# Patient Record
Sex: FEMALE | Race: WHITE | NOT HISPANIC OR LATINO | Employment: OTHER | URBAN - METROPOLITAN AREA
[De-identification: names, ages, dates, MRNs, and addresses within clinical notes are randomized per-mention and may not be internally consistent; named-entity substitution may affect disease eponyms.]

---

## 2024-02-08 DIAGNOSIS — M25.552 ACUTE POSTOPERATIVE PAIN OF LEFT HIP: Primary | ICD-10-CM

## 2024-02-08 DIAGNOSIS — G89.18 ACUTE POSTOPERATIVE PAIN OF LEFT HIP: Primary | ICD-10-CM

## 2024-03-21 ENCOUNTER — OFFICE VISIT (OUTPATIENT)
Dept: ORTHOPEDICS | Facility: CLINIC | Age: 69
End: 2024-03-21
Payer: MEDICARE

## 2024-03-21 ENCOUNTER — LAB VISIT (OUTPATIENT)
Dept: LAB | Facility: HOSPITAL | Age: 69
End: 2024-03-21
Attending: ORTHOPAEDIC SURGERY
Payer: MEDICARE

## 2024-03-21 ENCOUNTER — HOSPITAL ENCOUNTER (OUTPATIENT)
Dept: RADIOLOGY | Facility: CLINIC | Age: 69
Discharge: HOME | End: 2024-03-21
Attending: ORTHOPAEDIC SURGERY
Payer: MEDICARE

## 2024-03-21 VITALS
BODY MASS INDEX: 34.66 KG/M2 | WEIGHT: 208 LBS | HEIGHT: 65 IN | SYSTOLIC BLOOD PRESSURE: 138 MMHG | DIASTOLIC BLOOD PRESSURE: 76 MMHG | HEART RATE: 68 BPM

## 2024-03-21 DIAGNOSIS — M25.552 ACUTE POSTOPERATIVE PAIN OF LEFT HIP: ICD-10-CM

## 2024-03-21 DIAGNOSIS — G89.18 ACUTE POSTOPERATIVE PAIN OF LEFT HIP: ICD-10-CM

## 2024-03-21 DIAGNOSIS — Z96.642 HX OF TOTAL HIP ARTHROPLASTY, LEFT: Primary | ICD-10-CM

## 2024-03-21 DIAGNOSIS — T84.018A FAILED TOTAL HIP ARTHROPLASTY, INITIAL ENCOUNTER: ICD-10-CM

## 2024-03-21 DIAGNOSIS — Z96.642 HX OF TOTAL HIP ARTHROPLASTY, LEFT: ICD-10-CM

## 2024-03-21 DIAGNOSIS — Z96.649 FAILED TOTAL HIP ARTHROPLASTY, INITIAL ENCOUNTER: ICD-10-CM

## 2024-03-21 LAB
BASOPHILS # BLD AUTO: 0.02 X10(3)/MCL
BASOPHILS NFR BLD AUTO: 0.2 %
CRP SERPL HS-MCNC: 1.72 MG/L
EOSINOPHIL # BLD AUTO: 0.05 X10(3)/MCL (ref 0–0.9)
EOSINOPHIL NFR BLD AUTO: 0.4 %
ERYTHROCYTE [DISTWIDTH] IN BLOOD BY AUTOMATED COUNT: 14.4 % (ref 11.5–17)
ERYTHROCYTE [SEDIMENTATION RATE] IN BLOOD: 24 MM/HR (ref 0–20)
HCT VFR BLD AUTO: 44.4 % (ref 37–47)
HGB BLD-MCNC: 14.5 G/DL (ref 12–16)
IMM GRANULOCYTES # BLD AUTO: 0.04 X10(3)/MCL (ref 0–0.04)
IMM GRANULOCYTES NFR BLD AUTO: 0.3 %
LYMPHOCYTES # BLD AUTO: 1.25 X10(3)/MCL (ref 0.6–4.6)
LYMPHOCYTES NFR BLD AUTO: 10.1 %
MCH RBC QN AUTO: 31.1 PG (ref 27–31)
MCHC RBC AUTO-ENTMCNC: 32.7 G/DL (ref 33–36)
MCV RBC AUTO: 95.3 FL (ref 80–94)
MONOCYTES # BLD AUTO: 0.8 X10(3)/MCL (ref 0.1–1.3)
MONOCYTES NFR BLD AUTO: 6.4 %
NEUTROPHILS # BLD AUTO: 10.27 X10(3)/MCL (ref 2.1–9.2)
NEUTROPHILS NFR BLD AUTO: 82.6 %
NRBC BLD AUTO-RTO: 0 %
PLATELET # BLD AUTO: 417 X10(3)/MCL (ref 130–400)
PMV BLD AUTO: 9.9 FL (ref 7.4–10.4)
RBC # BLD AUTO: 4.66 X10(6)/MCL (ref 4.2–5.4)
WBC # SPEC AUTO: 12.43 X10(3)/MCL (ref 4.5–11.5)

## 2024-03-21 PROCEDURE — 73502 X-RAY EXAM HIP UNI 2-3 VIEWS: CPT | Mod: LT,,, | Performed by: ORTHOPAEDIC SURGERY

## 2024-03-21 PROCEDURE — 36415 COLL VENOUS BLD VENIPUNCTURE: CPT

## 2024-03-21 PROCEDURE — 99204 OFFICE O/P NEW MOD 45 MIN: CPT | Mod: ,,, | Performed by: ORTHOPAEDIC SURGERY

## 2024-03-21 PROCEDURE — 85025 COMPLETE CBC W/AUTO DIFF WBC: CPT

## 2024-03-21 PROCEDURE — 3288F FALL RISK ASSESSMENT DOCD: CPT | Mod: CPTII,,, | Performed by: ORTHOPAEDIC SURGERY

## 2024-03-21 PROCEDURE — 3078F DIAST BP <80 MM HG: CPT | Mod: CPTII,,, | Performed by: ORTHOPAEDIC SURGERY

## 2024-03-21 PROCEDURE — 1101F PT FALLS ASSESS-DOCD LE1/YR: CPT | Mod: CPTII,,, | Performed by: ORTHOPAEDIC SURGERY

## 2024-03-21 PROCEDURE — 3008F BODY MASS INDEX DOCD: CPT | Mod: CPTII,,, | Performed by: ORTHOPAEDIC SURGERY

## 2024-03-21 PROCEDURE — 1159F MED LIST DOCD IN RCRD: CPT | Mod: CPTII,,, | Performed by: ORTHOPAEDIC SURGERY

## 2024-03-21 PROCEDURE — 85652 RBC SED RATE AUTOMATED: CPT

## 2024-03-21 PROCEDURE — 4010F ACE/ARB THERAPY RXD/TAKEN: CPT | Mod: CPTII,,, | Performed by: ORTHOPAEDIC SURGERY

## 2024-03-21 PROCEDURE — 3075F SYST BP GE 130 - 139MM HG: CPT | Mod: CPTII,,, | Performed by: ORTHOPAEDIC SURGERY

## 2024-03-21 PROCEDURE — 86141 C-REACTIVE PROTEIN HS: CPT

## 2024-03-21 RX ORDER — CLINDAMYCIN HYDROCHLORIDE 300 MG/1
300 CAPSULE ORAL 3 TIMES DAILY PRN
Status: ON HOLD | COMMUNITY
End: 2024-06-10 | Stop reason: ALTCHOICE

## 2024-03-21 RX ORDER — DICLOFENAC SODIUM 75 MG/1
75 TABLET, DELAYED RELEASE ORAL 2 TIMES DAILY PRN
Status: ON HOLD | COMMUNITY
Start: 2024-03-09 | End: 2024-06-11 | Stop reason: HOSPADM

## 2024-03-21 RX ORDER — ATORVASTATIN CALCIUM 40 MG/1
40 TABLET, FILM COATED ORAL NIGHTLY
COMMUNITY
Start: 2024-03-13

## 2024-03-21 RX ORDER — LEVOTHYROXINE SODIUM 88 UG/1
88 TABLET ORAL NIGHTLY
COMMUNITY
Start: 2024-03-14

## 2024-03-21 RX ORDER — LISINOPRIL 10 MG/1
10 TABLET ORAL NIGHTLY
COMMUNITY
Start: 2024-03-11

## 2024-03-21 RX ORDER — DICLOFENAC SODIUM 25 MG/1
25 TABLET, DELAYED RELEASE ORAL 2 TIMES DAILY
COMMUNITY
End: 2024-04-11

## 2024-03-21 RX ORDER — GABAPENTIN 100 MG/1
100 CAPSULE ORAL 2 TIMES DAILY
COMMUNITY
Start: 2024-02-29

## 2024-03-21 RX ORDER — NITROFURANTOIN 25; 75 MG/1; MG/1
100 CAPSULE ORAL 2 TIMES DAILY
COMMUNITY
End: 2024-06-07 | Stop reason: CLARIF

## 2024-03-21 NOTE — PROGRESS NOTES
Chief Complaint:   Chief Complaint   Patient presents with    Left Hip - Pain     Left hip pain starting feb 2023. Had KARON in 2017. Pain started after 3 days of walking on treadmill. Tried anti inflammatory with no relief. Did physical therapy in August and had cortisone injections with minimum relief. Ambulates without assistive devices.        History of present illness:  69 left hip.  Patient has history of total hip arthroplasty performed about 6 or 7 years ago.  She would well for about 3 or 4 years however began developing pain 2 years prior.  She continues to have significant points of pain in the left hip.  Pain is located in her groin.  It was having issues with radicular pain has been treated for this.  The groin pain has persisted.  She does have positive start-up pain.  It was difficulty ambulating on occasion.  Uses a cane.  Has tried anti-inflammatories.  Has tried activity modification.  MRI was reviewed and performed without significant abnormality noted.  As well as bone scan.    Past Medical History:   Diagnosis Date    Arthritis     Avulsion fracture of metatarsal bone of left foot     Bursitis of left hip     Heart valve regurgitation     High cholesterol     Hypertension        Past Surgical History:   Procedure Laterality Date    CYSTOSCOPY      DILATION AND CURETTAGE OF UTERUS      LAPAROSCOPY      RADIONUCLIDE BONE SCAN      TOTAL HIP ARTHROPLASTY Left        Current Outpatient Medications   Medication Sig    atorvastatin (LIPITOR) 40 MG tablet Take 40 mg by mouth.    clindamycin (CLEOCIN) 300 MG capsule Take 300 mg by mouth 3 (three) times daily.    diclofenac (VOLTAREN) 25 MG TbEC Take 25 mg by mouth 2 (two) times daily.    diclofenac (VOLTAREN) 75 MG EC tablet Take 75 mg by mouth 2 (two) times daily as needed.    gabapentin (NEURONTIN) 100 MG capsule Take 100 mg by mouth 2 (two) times daily.    levothyroxine (SYNTHROID) 88 MCG tablet Take 88 mcg by mouth.    lisinopriL 10 MG tablet Take  10 mg by mouth.    nitrofurantoin, macrocrystal-monohydrate, (MACROBID) 100 MG capsule Take 100 mg by mouth 2 (two) times daily.     No current facility-administered medications for this visit.       Review of patient's allergies indicates:   Allergen Reactions    Bactrim [sulfamethoxazole-trimethoprim]     Honey     Latex, natural rubber     Penicillins     Shellfish containing products     Sulfa (sulfonamide antibiotics)     Wheat containing prod     Yeast        Family History   Family history unknown: Yes       Social History     Socioeconomic History    Marital status: Unknown   Tobacco Use    Smoking status: Never    Smokeless tobacco: Never   Substance and Sexual Activity    Alcohol use: Not Currently           Review of Systems:    Constitution: Negative for chills, fever, and sweats.  Negative for unexplained weight loss.    HENT:  Negative for headaches and blurry vision.    Cardiovascular:Negative for chest pain or irregular heart beat. Negative for hypertension.    Respiratory:  Negative for cough and shortness of breath.    Gastrointestinal: Negative for abdominal pain, heartburn, melena, nausea, and vomitting.    Genitourinary:  Negative bladder incontinence and dysuria.    Musculoskeletal:  See HPI    Neurological: Negative for numbness.    Psychiatric/Behavioral: Negative for depression.  The patient is not nervous/anxious.      Endocrine: Negative for polyuria    Hematologic/Lymphatic: Negative for bleeding problem.  Does not bruise/bleed easily.    Skin: Negative for poor would healing and rash      Physical Examination:    Vital Signs:    Vitals:    03/21/24 0749   BP: 138/76   Pulse: 68       Body mass index is 34.61 kg/m².    General: No acute distress, alert and oriented, healthy appearing    HEENT: Head is atraumatic, mucous membranes are moist    Neck: Supples, no JVD    Cardiovascular: Palpable dorsalis pedis and posterior tibial pulses, regular rate and rhythm to those pulses    Lungs:  Breathing non-labored    Skin: no rashes appreciated    Neurologic: Can flex and extend knees, ankles, and toes. Sensation is grossly intact    Left hip:  Range of motion of the left hip with pain and discomfort in the groin.  She is brisk cap refill disappeared sensation intact distally.  Internal and external rotation cause the patient's groin pain.  She has a positive Stinchfield to the left hip.    X-rays:  Three views of the left hip reviewed.  Patient with total hip arthroplasty.  Femur appears to be well fixed.  Acetabulum.  It was radiolucent lines in zone 1 and 2 and likely zone 3.  MRI was reviewed really not very helpful.     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  Patient's exam is consistent with a loose acetabular component.  Her x-rays also consistent with a likely acetabular component.  We discussed all treatment options today.  She would like to hold off on further surgical intervention at the current time.  We will plan to work her up for infection.  It was plan to see her back in a few weeks for further discussion.  With the patient will eventually require revision of her left hip.  Acetabular component.    This note was created using [employer] voice recognition software that occasionally misinterpreted phrases or words.    Consult note is delivered via Epic messaging service.

## 2024-04-11 ENCOUNTER — OFFICE VISIT (OUTPATIENT)
Dept: ORTHOPEDICS | Facility: CLINIC | Age: 69
End: 2024-04-11
Payer: MEDICARE

## 2024-04-11 VITALS
HEIGHT: 65 IN | HEART RATE: 70 BPM | WEIGHT: 205.81 LBS | BODY MASS INDEX: 34.29 KG/M2 | SYSTOLIC BLOOD PRESSURE: 129 MMHG | DIASTOLIC BLOOD PRESSURE: 88 MMHG

## 2024-04-11 DIAGNOSIS — T84.018A FAILED TOTAL HIP ARTHROPLASTY, INITIAL ENCOUNTER: Primary | ICD-10-CM

## 2024-04-11 DIAGNOSIS — Z96.649 FAILED TOTAL HIP ARTHROPLASTY, INITIAL ENCOUNTER: Primary | ICD-10-CM

## 2024-04-11 PROCEDURE — 3008F BODY MASS INDEX DOCD: CPT | Mod: CPTII,,, | Performed by: ORTHOPAEDIC SURGERY

## 2024-04-11 PROCEDURE — 3288F FALL RISK ASSESSMENT DOCD: CPT | Mod: CPTII,,, | Performed by: ORTHOPAEDIC SURGERY

## 2024-04-11 PROCEDURE — 4010F ACE/ARB THERAPY RXD/TAKEN: CPT | Mod: CPTII,,, | Performed by: ORTHOPAEDIC SURGERY

## 2024-04-11 PROCEDURE — 1125F AMNT PAIN NOTED PAIN PRSNT: CPT | Mod: CPTII,,, | Performed by: ORTHOPAEDIC SURGERY

## 2024-04-11 PROCEDURE — 3079F DIAST BP 80-89 MM HG: CPT | Mod: CPTII,,, | Performed by: ORTHOPAEDIC SURGERY

## 2024-04-11 PROCEDURE — 3074F SYST BP LT 130 MM HG: CPT | Mod: CPTII,,, | Performed by: ORTHOPAEDIC SURGERY

## 2024-04-11 PROCEDURE — 99214 OFFICE O/P EST MOD 30 MIN: CPT | Mod: ,,, | Performed by: ORTHOPAEDIC SURGERY

## 2024-04-11 PROCEDURE — 1159F MED LIST DOCD IN RCRD: CPT | Mod: CPTII,,, | Performed by: ORTHOPAEDIC SURGERY

## 2024-04-11 PROCEDURE — 1101F PT FALLS ASSESS-DOCD LE1/YR: CPT | Mod: CPTII,,, | Performed by: ORTHOPAEDIC SURGERY

## 2024-04-11 RX ORDER — SODIUM CHLORIDE 9 MG/ML
INJECTION, SOLUTION INTRAVENOUS CONTINUOUS
Status: CANCELLED | OUTPATIENT
Start: 2024-04-11

## 2024-04-11 NOTE — PROGRESS NOTES
Chief Complaint:   Chief Complaint   Patient presents with    Left Hip - Follow-up     Lab results-Pt states she had an injection last week which helped to improve the discomfort in her lower back.        History of present illness:  69-year-old female presents today for evaluation follow-up of painful total hip on the left side.  Patient continues to have significant complaints of pain in the left hip.  Worse with activity.  Improved by rest.  Patient has tried anti-inflammatories.  Has tried activity modification she was uses a cane.  Patient is here to go over blood work.  Likely loose acetabular component.    Past Medical History:   Diagnosis Date    Arthritis     Avulsion fracture of metatarsal bone of left foot     Bursitis of left hip     Heart valve regurgitation     High cholesterol     Hypertension        Past Surgical History:   Procedure Laterality Date    CYSTOSCOPY      DILATION AND CURETTAGE OF UTERUS      LAPAROSCOPY      RADIONUCLIDE BONE SCAN      TOTAL HIP ARTHROPLASTY Left        Current Outpatient Medications   Medication Sig    atorvastatin (LIPITOR) 40 MG tablet Take 40 mg by mouth.    clindamycin (CLEOCIN) 300 MG capsule Take 300 mg by mouth 3 (three) times daily.    diclofenac (VOLTAREN) 75 MG EC tablet Take 75 mg by mouth 2 (two) times daily as needed.    gabapentin (NEURONTIN) 100 MG capsule Take 100 mg by mouth 2 (two) times daily.    levothyroxine (SYNTHROID) 88 MCG tablet Take 88 mcg by mouth.    lisinopriL 10 MG tablet Take 10 mg by mouth.    nitrofurantoin, macrocrystal-monohydrate, (MACROBID) 100 MG capsule Take 100 mg by mouth 2 (two) times daily.     No current facility-administered medications for this visit.       Review of patient's allergies indicates:   Allergen Reactions    Bactrim [sulfamethoxazole-trimethoprim]     Honey     Latex, natural rubber     Penicillins     Shellfish containing products     Sulfa (sulfonamide antibiotics)     Wheat containing prod     Yeast         Family History   Family history unknown: Yes       Social History     Socioeconomic History    Marital status:    Tobacco Use    Smoking status: Never    Smokeless tobacco: Never   Substance and Sexual Activity    Alcohol use: Yes     Comment: Seldom    Drug use: Never    Sexual activity: Yes     Partners: Male           Review of Systems:    Constitution: Negative for chills, fever, and sweats.  Negative for unexplained weight loss.    HENT:  Negative for headaches and blurry vision.    Cardiovascular:Negative for chest pain or irregular heart beat. Negative for hypertension.    Respiratory:  Negative for cough and shortness of breath.    Gastrointestinal: Negative for abdominal pain, heartburn, melena, nausea, and vomitting.    Genitourinary:  Negative bladder incontinence and dysuria.    Musculoskeletal:  See HPI    Neurological: Negative for numbness.    Psychiatric/Behavioral: Negative for depression.  The patient is not nervous/anxious.      Endocrine: Negative for polyuria    Hematologic/Lymphatic: Negative for bleeding problem.  Does not bruise/bleed easily.    Skin: Negative for poor would healing and rash      Physical Examination:    Vital Signs:    Vitals:    04/11/24 0807   BP: 129/88   Pulse: 70       Body mass index is 34.25 kg/m².    General: No acute distress, alert and oriented, healthy appearing    HEENT: Head is atraumatic, mucous membranes are moist    Neck: Supples, no JVD    Cardiovascular: Palpable dorsalis pedis and posterior tibial pulses, regular rate and rhythm to those pulses    Lungs: Breathing non-labored    Skin: no rashes appreciated    Neurologic: Can flex and extend knees, ankles, and toes. Sensation is grossly intact    Left hip:  Range of motion left hip with groin pain.  Brisk cap refill disappeared sensation intact distally.    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  Patient was elevated white count, slightly, elevated ESR, slightly.  Discussed all treatment options from a treatment standpoint.  Patient with a failed acetabular component with failure of ingrowth and broken screw.  We would require revision of the acetabulum.  She would like to proceed with this.  She feels as though he has reached a point disability.  We will need to do an aspiration preoperatively as well.  Risks, benefits alternatives to left hip aspiration under anesthesia were discussed in detail.  We will plan to do this in the next couple weeks.  She would also like to go ahead and proceed with surgical intervention.  We will see her back afterwards to go over the results of the aspiration as well as to her preop for revision of her left hip, acetabular component only.    This note was created using [employer] voice recognition software that occasionally misinterpreted phrases or words.    Consult note is delivered via Epic messaging service.

## 2024-04-11 NOTE — H&P (VIEW-ONLY)
Chief Complaint:   Chief Complaint   Patient presents with    Left Hip - Follow-up     Lab results-Pt states she had an injection last week which helped to improve the discomfort in her lower back.        History of present illness:  69-year-old female presents today for evaluation follow-up of painful total hip on the left side.  Patient continues to have significant complaints of pain in the left hip.  Worse with activity.  Improved by rest.  Patient has tried anti-inflammatories.  Has tried activity modification she was uses a cane.  Patient is here to go over blood work.  Likely loose acetabular component.    Past Medical History:   Diagnosis Date    Arthritis     Avulsion fracture of metatarsal bone of left foot     Bursitis of left hip     Heart valve regurgitation     High cholesterol     Hypertension        Past Surgical History:   Procedure Laterality Date    CYSTOSCOPY      DILATION AND CURETTAGE OF UTERUS      LAPAROSCOPY      RADIONUCLIDE BONE SCAN      TOTAL HIP ARTHROPLASTY Left        Current Outpatient Medications   Medication Sig    atorvastatin (LIPITOR) 40 MG tablet Take 40 mg by mouth.    clindamycin (CLEOCIN) 300 MG capsule Take 300 mg by mouth 3 (three) times daily.    diclofenac (VOLTAREN) 75 MG EC tablet Take 75 mg by mouth 2 (two) times daily as needed.    gabapentin (NEURONTIN) 100 MG capsule Take 100 mg by mouth 2 (two) times daily.    levothyroxine (SYNTHROID) 88 MCG tablet Take 88 mcg by mouth.    lisinopriL 10 MG tablet Take 10 mg by mouth.    nitrofurantoin, macrocrystal-monohydrate, (MACROBID) 100 MG capsule Take 100 mg by mouth 2 (two) times daily.     No current facility-administered medications for this visit.       Review of patient's allergies indicates:   Allergen Reactions    Bactrim [sulfamethoxazole-trimethoprim]     Honey     Latex, natural rubber     Penicillins     Shellfish containing products     Sulfa (sulfonamide antibiotics)     Wheat containing prod     Yeast         Family History   Family history unknown: Yes       Social History     Socioeconomic History    Marital status:    Tobacco Use    Smoking status: Never    Smokeless tobacco: Never   Substance and Sexual Activity    Alcohol use: Yes     Comment: Seldom    Drug use: Never    Sexual activity: Yes     Partners: Male           Review of Systems:    Constitution: Negative for chills, fever, and sweats.  Negative for unexplained weight loss.    HENT:  Negative for headaches and blurry vision.    Cardiovascular:Negative for chest pain or irregular heart beat. Negative for hypertension.    Respiratory:  Negative for cough and shortness of breath.    Gastrointestinal: Negative for abdominal pain, heartburn, melena, nausea, and vomitting.    Genitourinary:  Negative bladder incontinence and dysuria.    Musculoskeletal:  See HPI    Neurological: Negative for numbness.    Psychiatric/Behavioral: Negative for depression.  The patient is not nervous/anxious.      Endocrine: Negative for polyuria    Hematologic/Lymphatic: Negative for bleeding problem.  Does not bruise/bleed easily.    Skin: Negative for poor would healing and rash      Physical Examination:    Vital Signs:    Vitals:    04/11/24 0807   BP: 129/88   Pulse: 70       Body mass index is 34.25 kg/m².    General: No acute distress, alert and oriented, healthy appearing    HEENT: Head is atraumatic, mucous membranes are moist    Neck: Supples, no JVD    Cardiovascular: Palpable dorsalis pedis and posterior tibial pulses, regular rate and rhythm to those pulses    Lungs: Breathing non-labored    Skin: no rashes appreciated    Neurologic: Can flex and extend knees, ankles, and toes. Sensation is grossly intact    Left hip:  Range of motion left hip with groin pain.  Brisk cap refill disappeared sensation intact distally.    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  Patient was elevated white count, slightly, elevated ESR, slightly.  Discussed all treatment options from a treatment standpoint.  Patient with a failed acetabular component with failure of ingrowth and broken screw.  We would require revision of the acetabulum.  She would like to proceed with this.  She feels as though he has reached a point disability.  We will need to do an aspiration preoperatively as well.  Risks, benefits alternatives to left hip aspiration under anesthesia were discussed in detail.  We will plan to do this in the next couple weeks.  She would also like to go ahead and proceed with surgical intervention.  We will see her back afterwards to go over the results of the aspiration as well as to her preop for revision of her left hip, acetabular component only.    This note was created using [employer] voice recognition software that occasionally misinterpreted phrases or words.    Consult note is delivered via Epic messaging service.

## 2024-04-15 ENCOUNTER — ANESTHESIA EVENT (OUTPATIENT)
Dept: SURGERY | Facility: HOSPITAL | Age: 69
End: 2024-04-15
Payer: MEDICARE

## 2024-04-24 ENCOUNTER — PATIENT MESSAGE (OUTPATIENT)
Dept: ADMINISTRATIVE | Facility: OTHER | Age: 69
End: 2024-04-24
Payer: MEDICARE

## 2024-04-25 ENCOUNTER — PATIENT MESSAGE (OUTPATIENT)
Dept: ADMINISTRATIVE | Facility: OTHER | Age: 69
End: 2024-04-25
Payer: MEDICARE

## 2024-04-26 ENCOUNTER — PATIENT MESSAGE (OUTPATIENT)
Dept: ADMINISTRATIVE | Facility: OTHER | Age: 69
End: 2024-04-26
Payer: MEDICARE

## 2024-04-26 ENCOUNTER — HOSPITAL ENCOUNTER (OUTPATIENT)
Facility: HOSPITAL | Age: 69
Discharge: HOME | End: 2024-04-26
Attending: ORTHOPAEDIC SURGERY | Admitting: ORTHOPAEDIC SURGERY
Payer: MEDICARE

## 2024-04-26 ENCOUNTER — ANESTHESIA (OUTPATIENT)
Dept: SURGERY | Facility: HOSPITAL | Age: 69
End: 2024-04-26
Payer: MEDICARE

## 2024-04-26 DIAGNOSIS — Z96.649 FAILED TOTAL HIP ARTHROPLASTY, INITIAL ENCOUNTER: ICD-10-CM

## 2024-04-26 DIAGNOSIS — T84.018A FAILED TOTAL HIP ARTHROPLASTY, INITIAL ENCOUNTER: Primary | ICD-10-CM

## 2024-04-26 DIAGNOSIS — Z96.649 FAILED TOTAL HIP ARTHROPLASTY, INITIAL ENCOUNTER: Primary | ICD-10-CM

## 2024-04-26 DIAGNOSIS — T84.018A FAILED TOTAL HIP ARTHROPLASTY, INITIAL ENCOUNTER: ICD-10-CM

## 2024-04-26 LAB
%MONO NUCL BF (OHS): 86 %
%POLYMORP BF(OHS): 14 %
CLARITY BODY FLUID (OHS): NORMAL
COLOR BODY FLUID (OHS): YELLOW
RBC COUNT BODY FLUID (OHS): 2000 /UL
WBC # FLD AUTO: 518 /UL

## 2024-04-26 PROCEDURE — D9220A PRA ANESTHESIA: Mod: ANES,,, | Performed by: STUDENT IN AN ORGANIZED HEALTH CARE EDUCATION/TRAINING PROGRAM

## 2024-04-26 PROCEDURE — 87102 FUNGUS ISOLATION CULTURE: CPT | Performed by: ORTHOPAEDIC SURGERY

## 2024-04-26 PROCEDURE — 20610 DRAIN/INJ JOINT/BURSA W/O US: CPT | Mod: LT,,, | Performed by: ORTHOPAEDIC SURGERY

## 2024-04-26 PROCEDURE — D9220A PRA ANESTHESIA: Mod: CRNA,,, | Performed by: NURSE ANESTHETIST, CERTIFIED REGISTERED

## 2024-04-26 PROCEDURE — 37000008 HC ANESTHESIA 1ST 15 MINUTES: Performed by: ORTHOPAEDIC SURGERY

## 2024-04-26 PROCEDURE — 25000003 PHARM REV CODE 250: Performed by: ORTHOPAEDIC SURGERY

## 2024-04-26 PROCEDURE — 89051 BODY FLUID CELL COUNT: CPT | Performed by: ORTHOPAEDIC SURGERY

## 2024-04-26 PROCEDURE — 87070 CULTURE OTHR SPECIMN AEROBIC: CPT | Performed by: ORTHOPAEDIC SURGERY

## 2024-04-26 PROCEDURE — 77002 NEEDLE LOCALIZATION BY XRAY: CPT | Mod: 26,,, | Performed by: ORTHOPAEDIC SURGERY

## 2024-04-26 PROCEDURE — 87205 SMEAR GRAM STAIN: CPT | Performed by: ORTHOPAEDIC SURGERY

## 2024-04-26 PROCEDURE — 71000015 HC POSTOP RECOV 1ST HR: Performed by: ORTHOPAEDIC SURGERY

## 2024-04-26 PROCEDURE — 63600175 PHARM REV CODE 636 W HCPCS: Performed by: NURSE ANESTHETIST, CERTIFIED REGISTERED

## 2024-04-26 PROCEDURE — 25000003 PHARM REV CODE 250: Performed by: NURSE ANESTHETIST, CERTIFIED REGISTERED

## 2024-04-26 PROCEDURE — 27095 INJECTION FOR HIP X-RAY: CPT | Mod: LT,,, | Performed by: ORTHOPAEDIC SURGERY

## 2024-04-26 PROCEDURE — 36000704 HC OR TIME LEV I 1ST 15 MIN: Performed by: ORTHOPAEDIC SURGERY

## 2024-04-26 PROCEDURE — 87075 CULTR BACTERIA EXCEPT BLOOD: CPT | Performed by: ORTHOPAEDIC SURGERY

## 2024-04-26 RX ORDER — LIDOCAINE HYDROCHLORIDE 10 MG/ML
INJECTION, SOLUTION EPIDURAL; INFILTRATION; INTRACAUDAL; PERINEURAL
Status: DISCONTINUED | OUTPATIENT
Start: 2024-04-26 | End: 2024-04-26

## 2024-04-26 RX ORDER — LIDOCAINE HYDROCHLORIDE 10 MG/ML
INJECTION INFILTRATION; PERINEURAL
Status: DISCONTINUED | OUTPATIENT
Start: 2024-04-26 | End: 2024-04-26 | Stop reason: HOSPADM

## 2024-04-26 RX ORDER — PROPOFOL 10 MG/ML
VIAL (ML) INTRAVENOUS
Status: DISCONTINUED | OUTPATIENT
Start: 2024-04-26 | End: 2024-04-26

## 2024-04-26 RX ORDER — LIDOCAINE HYDROCHLORIDE 10 MG/ML
INJECTION INFILTRATION; PERINEURAL
Status: DISCONTINUED
Start: 2024-04-26 | End: 2024-04-26 | Stop reason: HOSPADM

## 2024-04-26 RX ORDER — SODIUM CHLORIDE 9 MG/ML
INJECTION, SOLUTION INTRAVENOUS CONTINUOUS
Status: DISCONTINUED | OUTPATIENT
Start: 2024-04-26 | End: 2024-04-26 | Stop reason: HOSPADM

## 2024-04-26 RX ORDER — BETAMETHASONE SODIUM PHOSPHATE AND BETAMETHASONE ACETATE 3; 3 MG/ML; MG/ML
INJECTION, SUSPENSION INTRA-ARTICULAR; INTRALESIONAL; INTRAMUSCULAR; SOFT TISSUE
Status: DISCONTINUED
Start: 2024-04-26 | End: 2024-04-26 | Stop reason: WASHOUT

## 2024-04-26 RX ADMIN — PROPOFOL 70 MG: 10 INJECTION, EMULSION INTRAVENOUS at 07:04

## 2024-04-26 RX ADMIN — PROPOFOL 50 MG: 10 INJECTION, EMULSION INTRAVENOUS at 07:04

## 2024-04-26 RX ADMIN — SODIUM CHLORIDE, SODIUM GLUCONATE, SODIUM ACETATE, POTASSIUM CHLORIDE AND MAGNESIUM CHLORIDE: 526; 502; 368; 37; 30 INJECTION, SOLUTION INTRAVENOUS at 06:04

## 2024-04-26 RX ADMIN — LIDOCAINE HYDROCHLORIDE 5 ML: 10 INJECTION, SOLUTION EPIDURAL; INFILTRATION; INTRACAUDAL; PERINEURAL at 07:04

## 2024-04-26 NOTE — ANESTHESIA PREPROCEDURE EVALUATION
04/26/2024  Radha Alexandre is a 69 y.o., female.      Pre-op Assessment    I have reviewed the Patient Summary Reports.     I have reviewed the Nursing Notes. I have reviewed the NPO Status.   I have reviewed the Medications.     Review of Systems  Anesthesia Hx:               Denies Personal Hx of Anesthesia complications.                    Cardiovascular:     Hypertension                                        Pulmonary:  Pulmonary Normal                       Hepatic/GI:  Hepatic/GI Normal                 Neurological:  Neurology Normal                                      Endocrine:        Obesity / BMI > 30      Physical Exam  General: Well nourished, Cooperative and Alert    Airway:  Mallampati: II   Mouth Opening: Normal  TM Distance: Normal  Tongue: Normal    Dental:  Intact    Chest/Lungs:  Normal Respiratory Rate        Anesthesia Plan  Type of Anesthesia, risks & benefits discussed:    Anesthesia Type: Gen Natural Airway  Intra-op Monitoring Plan: Standard ASA Monitors  Post Op Pain Control Plan: IV/PO Opioids PRN  Induction:  IV  Informed Consent: Informed consent signed with the Patient and all parties understand the risks and agree with anesthesia plan.  All questions answered.   ASA Score: 2  Day of Surgery Review of History & Physical: H&P Update referred to the surgeon/provider.    Ready For Surgery From Anesthesia Perspective.     .

## 2024-04-26 NOTE — DISCHARGE INSTRUCTIONS
[facility] [place] [address]  [phone]    DIET    YOUR FIRST MEAL SHOULD BE LIQUID: I.E. SOUPS, JELLO, JUICE. IF YOUR LIQUID MEAL IS TOLERATED WELL THEN YOU MAY PROGRESS TO A SMALL LIGHT MEAL.   IF NAUSEA AND VOMITING OCCUR RETURN TO THE LIQUID DIET AND PROGRESS TO A NORMAL SOLID DIET SLOWLY.  IF THE NAUSEA AND VOMITING DOES NOT STOP, NOTIFY YOUR HEALTH CARE PROVIDER.      GENERAL ANESTHESIA OR SEDATION    DO NOT DRIVE OR PARTICIPATE IN ANY ACTIVITIES THAT REQUIRE COORDINATION FOR THE NEXT 24 HOURS: I.E. SWIMMING, BIKING, OPERATING HEAVY MACHINERY, COOKING, USING POWER TOOLS, CLIMBING LADDERS.   FOR THE NEXT 24 HOURS DO NOT: DRIVE, DRINK ALCOHOL, MAKE ANY IMPORTANT DECISIONS OR SIGN ANY LEGAL DOCUMENTS.   STAY WITH AN ADULT DURING THE 24 HOURS AFTER YOUR SURGERY.   DRINK ENOUGH FLUIDS TO KEEP YOUR URINE CLEAR TO PALE YELLOW.      PREVENTING CONSTIPATION AFTER SURGERY    EAT FOOD HIGH IN FIBER AND DRINK PLENTY OF FLUIDS, ESPECIALLY WATER.   YOU MAY TAKE AN OVER THE COUNTER FIBER SUPPLEMENT OR STOOL SOFTENER AS DIRECTED ON THE PACKAGE.   STAYING MOBILE, IF POSSIBLE, HELPS TO PREVENT CONSTIPATION.  CONTACT YOUR DOCTOR IF YOU HAVE NOT HAD A BOWEL MOVEMENT IN 3 DAYS AFTER SURGERY.       INFECTION CONTROL    KEEP YOUR DRESSING CLEAN, DRY AND INTACT.   FOLLOW PHYSICIAN INSTRUCTIONS REGARDING REMOVAL OF DRESSING.  DO NOT TOUCH SURGICAL SITE OR APPLY LOTIONS, POWDERS, CREAMS OR OINTMENTS ON YOUR INCISION UNLESS INSTRUCTED TO DO SO BY YOUR HEALTH CARE PROVIDER.  ONCE THE DRESSING HAS BEEN REMOVED, CHECK THE INCISION SITE DAILY FOR: INCREASED REDNESS, SWELLING, FLUID OR BLOOD, WARMTH, PUS, FOUL SMELL OR INCREASED PAIN.      DEEP VEIN THROMBOSIS (DVT)    A DVT IS A BLOOD CLOT THAT CAN DEVELOP IN THE DEEP AND LARGER VEINS OF THE LEG, ARM OR PELVIS.   RISK FACTORS INCLUDE SITTING OR LYING FOR LONG PERIODS OF TIME. THIS INCLUDES RECOVERING FROM SURGERY.  PREVENTION INCLUDES:  AVOID SITTING STILL FOR LONG PERIODS WITHOUT MOVING YOUR LEGS, DO NOT SMOKE AND IF POSSIBLE AVOID MEDICATIONS THAT CONTAIN ESTROGEN.   SIGNS AND SYMPTOMS THAT SHOULD BE REPORTED IMMEDIATELY INCLUDE: SWELLING IN AN ARM OR LEG, WARMTH, REDNESS OR PAIN IN ONE AREA OF THE LEG OR ARM THAT DOES NOT COME FROM THE INCISION. IF THE CLOT IS IN YOUR LEG, THE SYMPTOMS WILL BE WORSE WHEN STANDING OR WALKING.   SIGNS OF A PULMONARY EMBOLISM OR PE (A CLOT THAT MOVED TO YOUR LUNG): SHORTNESS OF BREATH, COUGHING , ESPECIALLY IF ACCOMPANIED WITH BLOODY MUCUS, CHEST PAIN OR RAPID HEART RATE.  IF YOU EXPERIENCE THESE SYMPTOMS, YOU SHOULD GET EMERGENCY TREATMENT RIGHT AWAY. DO NOT WAIT TO SEE IF THESE SYMPTOMS WILL GO AWAY. DO NOT DRIVE YOURSELF TO THE HOSPITAL.       CONTACT YOUR HEALTH CARE PROVIDER IF:    YOU HAVE REDNESS, SWELLING OR PAIN AROUND YOUR INCISION.  YOUR INCISION FEELS WARM TO THE TOUCH OR IS LEAKING EXCESSIVE FLUID/ BLOOD.  YOUR SUTURES OR STAPLES HAVE COME UNDONE.  YOU HAVE A TEMPERATURE .5 OR GREATER.  YOU ARE NOT ABLE TO URINATE WITHIN 6 HOURS AFTER SURGERY.  YOU HAVE UNRESOLVED NAUSEA AND VOMITING.       SEEK IMMEDIATE MEDICAL CARE IF:    YOU HAVE PERSISTENT NAUSEA AND VOMITING.   YOU ARE UNABLE TO EAT OR DRINK.   YOU HAVE DIFFICULTY SPEAKING AND/ OR BREATHING.  YOUR SKIN COLOR APPEARS BLUE OR GRAY.  YOU HAVE A RED STREAK COMING FROM YOUR INCISION.  YOUR INCISION BLEEDS THROUGH THE DRESSING AND DOES NOT STOP WITH GENTLY PRESSURE.  YOU HAVE SEVERE PAIN THAT DOES NOT DECREASE WITH YOUR MEDICATIONS.

## 2024-04-26 NOTE — TRANSFER OF CARE
"Anesthesia Transfer of Care Note    Patient: Radha Alexandre    Procedure(s) Performed: Procedure(s) (LRB):  ARTHROCENTESIS, HIP (Left)    Patient location: OPS    Anesthesia Type: MAC    Transport from OR: Transported from OR on room air with adequate spontaneous ventilation    Post pain: adequate analgesia    Post assessment: no apparent anesthetic complications    Post vital signs: stable    Level of consciousness: awake    Nausea/Vomiting: no nausea/vomiting    Complications: none    Transfer of care protocol was followed      Last vitals: Visit Vitals  /69   Pulse 70   Temp 37 °C (98.6 °F)   Resp 16   Ht 5' 5" (1.651 m)   Wt 92.9 kg (204 lb 12.9 oz)   SpO2 97%   BMI 34.08 kg/m²     "

## 2024-04-26 NOTE — OP NOTE
Date of Procedure: 4/26/2024    Procedure: L hip joint aspiration/arthrogram    Provider: Ashkan Taylor MD    Pre-Operative Diagnosis: painful total hip    Post-Operative Diagnosis: as above    Anesthesia: General/MAC    Description of the Findings of the Procedure:     The patient was brought to the procedure room.  IV access was obtained prior to the procedure.  The patient was positioned on the fluoroscopy table.  Sedation was administered by anesthesia.  The skin overlying the hip was prepped and draped in a sterile fashion.  The skin and subcutaneous tissue was anesthetized using 1-2 cc of lidocaine 2%.  An 18 gauge, spinal needle was slowly advanced through under fluoroscopic guidance into the acetabulofemoral joint. The needle position was confirmed using oblique, AP and lateral fluoroscopic imaging.  2 cc of Omnipaque 300 was injected confirming intra-articular contrast spread. Aspiration was performed at this point and 10 cc of straw colored fluid was able to be aspirated.  Fluid will be sent for cell count and cultures. The needle was removed and band-aid placed.  A sterile dressing was applied. Radha was taken to the Post-block Recovery Area for further observation. And discharged home when appropriate.    Complications: No    Estimated Blood Loss (EBL): Minimal           Specimens: none           Condition: Good    Disposition: PACU - hemodynamically stable.      Fluoroscopic guidance was used for needle localization.  Images were saved and stored for documentation.  The hip structures were identified and visualized.  Dynamic visualization of the 18g x 3.5 cm needle was continuous throughout the procedure and maintained in good position.

## 2024-04-27 ENCOUNTER — PATIENT MESSAGE (OUTPATIENT)
Dept: ADMINISTRATIVE | Facility: OTHER | Age: 69
End: 2024-04-27
Payer: MEDICARE

## 2024-04-27 LAB — GRAM STN SPEC: NORMAL

## 2024-04-27 NOTE — ANESTHESIA POSTPROCEDURE EVALUATION
Anesthesia Post Evaluation    Patient: Radha Alexandre    Procedure(s) Performed: Procedure(s) (LRB):  ARTHROCENTESIS, HIP (Left)    Final Anesthesia Type: general      Patient location during evaluation: PACU  Patient participation: Yes- Able to Participate  Level of consciousness: awake and alert  Post-procedure vital signs: reviewed and stable  Pain management: adequate  Airway patency: patent    PONV status at discharge: No PONV  Anesthetic complications: no      Respiratory status: unassisted  Hydration status: euvolemic  Follow-up not needed.              Vitals Value Taken Time   /55 04/26/24 0716   Temp nl 04/27/24 1710   Pulse 67 04/26/24 0723   Resp 18 04/26/24 0715   SpO2 98 % 04/26/24 0723   Vitals shown include unfiled device data.      No case tracking events are documented in the log.      Pain/Silvestre Score: Silvestre Score: 9 (4/26/2024  7:15 AM)  Modified Silvestre Score: 18 (4/26/2024  7:15 AM)

## 2024-04-28 ENCOUNTER — PATIENT MESSAGE (OUTPATIENT)
Dept: ADMINISTRATIVE | Facility: OTHER | Age: 69
End: 2024-04-28
Payer: MEDICARE

## 2024-04-29 VITALS
HEART RATE: 69 BPM | HEIGHT: 65 IN | WEIGHT: 204.81 LBS | RESPIRATION RATE: 18 BRPM | TEMPERATURE: 99 F | BODY MASS INDEX: 34.12 KG/M2 | OXYGEN SATURATION: 98 % | DIASTOLIC BLOOD PRESSURE: 55 MMHG | SYSTOLIC BLOOD PRESSURE: 103 MMHG

## 2024-04-29 LAB — BACTERIA SPEC ANAEROBE CULT: NORMAL

## 2024-04-29 NOTE — DISCHARGE SUMMARY
[facility] Orthopa[facility] - Periop Services  Discharge Note  Short Stay    Procedure(s) (LRB):  ARTHROCENTESIS, HIP (Left)      OUTCOME: Patient tolerated treatment/procedure well without complication and is now ready for discharge.    DISPOSITION: Home or Self Care    FINAL DIAGNOSIS:  Failed total hip arthroplasty, initial encounter    FOLLOWUP: In clinic    DISCHARGE INSTRUCTIONS:  No discharge procedures on file.      Clinical Reference Documents Added to Patient Instructions         Document    ARTHROCENTESIS (ENGLISH)            TIME SPENT ON DISCHARGE: 5 minutes

## 2024-05-01 LAB — BACTERIA FLD CULT: NORMAL

## 2024-05-27 LAB — FUNGUS SPEC CULT: NORMAL

## 2024-05-29 ENCOUNTER — ANESTHESIA EVENT (OUTPATIENT)
Dept: SURGERY | Facility: HOSPITAL | Age: 69
DRG: 468 | End: 2024-05-29
Payer: MEDICARE

## 2024-05-30 ENCOUNTER — HOSPITAL ENCOUNTER (OUTPATIENT)
Dept: RADIOLOGY | Facility: HOSPITAL | Age: 69
Discharge: HOME | End: 2024-05-30
Attending: NURSE PRACTITIONER
Payer: MEDICARE

## 2024-05-30 ENCOUNTER — OFFICE VISIT (OUTPATIENT)
Dept: ORTHOPEDICS | Facility: CLINIC | Age: 69
End: 2024-05-30
Payer: MEDICARE

## 2024-05-30 VITALS
DIASTOLIC BLOOD PRESSURE: 86 MMHG | SYSTOLIC BLOOD PRESSURE: 144 MMHG | BODY MASS INDEX: 34.49 KG/M2 | HEART RATE: 61 BPM | WEIGHT: 207 LBS | HEIGHT: 65 IN

## 2024-05-30 DIAGNOSIS — Z96.649 FAILED TOTAL HIP ARTHROPLASTY, INITIAL ENCOUNTER: Primary | ICD-10-CM

## 2024-05-30 DIAGNOSIS — T84.018A FAILED TOTAL HIP ARTHROPLASTY, INITIAL ENCOUNTER: Primary | ICD-10-CM

## 2024-05-30 DIAGNOSIS — Z96.642 HX OF TOTAL HIP ARTHROPLASTY, LEFT: ICD-10-CM

## 2024-05-30 DIAGNOSIS — T84.019A FAILED TOTAL JOINT REPLACEMENT: ICD-10-CM

## 2024-05-30 DIAGNOSIS — R79.9 ABNORMAL BLOOD CHEMISTRY LEVEL: ICD-10-CM

## 2024-05-30 DIAGNOSIS — Z01.818 PREOPERATIVE TESTING: ICD-10-CM

## 2024-05-30 DIAGNOSIS — T84.018A FAILED TOTAL HIP ARTHROPLASTY, INITIAL ENCOUNTER: ICD-10-CM

## 2024-05-30 DIAGNOSIS — Z96.649 FAILED TOTAL HIP ARTHROPLASTY, INITIAL ENCOUNTER: ICD-10-CM

## 2024-05-30 LAB — MRSA PCR SCRN (OHS): NOT DETECTED

## 2024-05-30 PROCEDURE — 3008F BODY MASS INDEX DOCD: CPT | Mod: CPTII,,, | Performed by: NURSE PRACTITIONER

## 2024-05-30 PROCEDURE — 3044F HG A1C LEVEL LT 7.0%: CPT | Mod: CPTII,,, | Performed by: NURSE PRACTITIONER

## 2024-05-30 PROCEDURE — 3077F SYST BP >= 140 MM HG: CPT | Mod: CPTII,,, | Performed by: NURSE PRACTITIONER

## 2024-05-30 PROCEDURE — 3079F DIAST BP 80-89 MM HG: CPT | Mod: CPTII,,, | Performed by: NURSE PRACTITIONER

## 2024-05-30 PROCEDURE — 4010F ACE/ARB THERAPY RXD/TAKEN: CPT | Mod: CPTII,,, | Performed by: NURSE PRACTITIONER

## 2024-05-30 PROCEDURE — 71046 X-RAY EXAM CHEST 2 VIEWS: CPT | Mod: TC

## 2024-05-30 PROCEDURE — 99214 OFFICE O/P EST MOD 30 MIN: CPT | Mod: ,,, | Performed by: NURSE PRACTITIONER

## 2024-05-30 PROCEDURE — 1101F PT FALLS ASSESS-DOCD LE1/YR: CPT | Mod: CPTII,,, | Performed by: NURSE PRACTITIONER

## 2024-05-30 PROCEDURE — 3288F FALL RISK ASSESSMENT DOCD: CPT | Mod: CPTII,,, | Performed by: NURSE PRACTITIONER

## 2024-05-30 RX ORDER — ACETAMINOPHEN 500 MG
1000 TABLET ORAL
Status: CANCELLED | OUTPATIENT
Start: 2024-05-30

## 2024-05-30 RX ORDER — SODIUM CHLORIDE, SODIUM GLUCONATE, SODIUM ACETATE, POTASSIUM CHLORIDE AND MAGNESIUM CHLORIDE 30; 37; 368; 526; 502 MG/100ML; MG/100ML; MG/100ML; MG/100ML; MG/100ML
INJECTION, SOLUTION INTRAVENOUS CONTINUOUS
Status: CANCELLED | OUTPATIENT
Start: 2024-05-30

## 2024-05-30 RX ORDER — CALCIUM CARBONATE 600 MG
600 TABLET ORAL DAILY
COMMUNITY

## 2024-05-30 RX ORDER — KETOROLAC TROMETHAMINE 10 MG/1
10 TABLET, FILM COATED ORAL
Status: CANCELLED | OUTPATIENT
Start: 2024-05-30 | End: 2024-05-30

## 2024-05-30 RX ORDER — TRANEXAMIC ACID 650 MG/1
1950 TABLET ORAL
Status: CANCELLED | OUTPATIENT
Start: 2024-05-30 | End: 2024-05-30

## 2024-05-30 RX ORDER — POTASSIUM &MAGNESIUM ASPARTATE 250-250 MG
1 CAPSULE ORAL DAILY
COMMUNITY

## 2024-05-30 RX ORDER — MULTIVITAMIN
1 TABLET ORAL DAILY
COMMUNITY

## 2024-05-30 RX ORDER — SCOLOPAMINE TRANSDERMAL SYSTEM 1 MG/1
1 PATCH, EXTENDED RELEASE TRANSDERMAL ONCE AS NEEDED
Status: CANCELLED | OUTPATIENT
Start: 2024-05-30 | End: 2035-10-27

## 2024-05-30 RX ORDER — GABAPENTIN 100 MG/1
300 CAPSULE ORAL
Status: CANCELLED | OUTPATIENT
Start: 2024-05-30

## 2024-05-30 RX ORDER — ONDANSETRON 4 MG/1
4 TABLET, ORALLY DISINTEGRATING ORAL
Status: CANCELLED | OUTPATIENT
Start: 2024-05-30

## 2024-05-30 NOTE — H&P (VIEW-ONLY)
Chief Complaint:   Chief Complaint   Patient presents with    Pre-op Exam     pre-op revision LT hip 6/10/24////LT hip aspiration on 4/26/24. Patient doing well overall. States her pain has been radiating more towards her anterior and posterior thigh. She states this may be related to her sciatica.       History of present illness:  69 left hip.  Patient has history of total hip arthroplasty performed about 6 or 7 years ago.  She would well for about 3 or 4 years however began developing pain 2 years prior.  She continues to have significant points of pain in the left hip.  Pain is located in her groin.  It was having issues with radicular pain has been treated for this.  The groin pain has persisted.  She does have positive start-up pain.  It was difficulty ambulating on occasion.  Uses a cane.  Has tried anti-inflammatories.  Has tried activity modification.  MRI was reviewed and performed without significant abnormality noted.  As well as bone scan.  Patient has been worked up for infection with an aspiration and found to be negative.  She does feel as though she has reached a point of disability and would like to proceed with a revision of her left total hip arthroplasty    Past Medical History:   Diagnosis Date    Arthritis     Avulsion fracture of metatarsal bone of left foot     Bursitis of left hip     Heart valve regurgitation     High cholesterol     Hypertension     Thyroid disease        Past Surgical History:   Procedure Laterality Date    ARTHROCENTESIS OF HIP JOINT Left 04/26/2024    Procedure: ARTHROCENTESIS, HIP;  Surgeon: Ashkan Taylor MD;  Location: [facility];  Service: Orthopedics;  Laterality: Left;  left hip aspiration    COLONOSCOPY      CYSTOSCOPY      DILATION AND CURETTAGE OF UTERUS      JOINT REPLACEMENT  Dec 2017    Total left hip replacement    LAPAROSCOPY      RADIONUCLIDE BONE SCAN         Current Outpatient Medications   Medication Sig    atorvastatin (LIPITOR) 40 MG tablet Take  40 mg by mouth every evening.    clindamycin (CLEOCIN) 300 MG capsule Take 300 mg by mouth 3 (three) times daily as needed. Only prn for dental work    diclofenac (VOLTAREN) 75 MG EC tablet Take 75 mg by mouth 2 (two) times daily as needed.    gabapentin (NEURONTIN) 100 MG capsule Take 100 mg by mouth 2 (two) times daily.    levothyroxine (SYNTHROID) 88 MCG tablet Take 88 mcg by mouth nightly.    lisinopriL 10 MG tablet Take 10 mg by mouth nightly.    nitrofurantoin, macrocrystal-monohydrate, (MACROBID) 100 MG capsule Take 100 mg by mouth 2 (two) times daily. Only prn UTI    calcium carbonate (OS-CLAY) 600 mg calcium (1,500 mg) Tab Take 600 mg by mouth once daily. With vit D3 (400IU)    cranberry 500 mg Cap Take 1 capsule by mouth once daily.    multivitamin (ONE DAILY MULTIVITAMIN) per tablet Take 1 tablet by mouth once daily.     No current facility-administered medications for this visit.       Review of patient's allergies indicates:   Allergen Reactions    Honey     Penicillins     Shellfish containing products     Sulfa (sulfonamide antibiotics)     Wheat containing prod     Yeast     Latex, natural rubber        Family History   Problem Relation Name Age of Onset    Arthritis Mother Radha Parsons     Depression Mother Radha Janeu     Heart disease Mother Radha Parsons     Hypertension Mother Radha Janeu     Kidney disease Mother Radha Parsons     Stroke Mother Radha Parsons     Early death Father Romario Valenzuelamk     Heart disease Father Romario SALGADO Issa     Hypertension Father Romario SALGADO Issa     Arthritis Brother Tavo Valenzuelayojoss     Cancer Brother Tavo Valenzuelayou     Kidney disease Brother Tavo Valenzuelayou     Cancer Brother Bernardo JONAHArnaldo Parsons     Diabetes Brother Bernardo JONAHArnaldo Valenzuelayojoss     Heart disease Sister Cori Green     Hypertension Sister Cori Green     Miscarriages / Stillbirths Sister Cori Green        Social History     Socioeconomic History    Marital status:     Tobacco Use    Smoking status: Never    Smokeless tobacco: Never   Substance and Sexual Activity    Alcohol use: Yes     Alcohol/week: 1.0 standard drink of alcohol     Types: 1 Glasses of wine per week     Comment: Seldom    Drug use: Never    Sexual activity: Yes     Partners: Male     Birth control/protection: None           Review of Systems:    Constitution: Negative for chills, fever, and sweats.  Negative for unexplained weight loss.    HENT:  Negative for headaches and blurry vision.    Cardiovascular:Negative for chest pain or irregular heart beat. Negative for hypertension.    Respiratory:  Negative for cough and shortness of breath.    Gastrointestinal: Negative for abdominal pain, heartburn, melena, nausea, and vomitting.    Genitourinary:  Negative bladder incontinence and dysuria.    Musculoskeletal:  See HPI    Neurological: Negative for numbness.    Psychiatric/Behavioral: Negative for depression.  The patient is not nervous/anxious.      Endocrine: Negative for polyuria    Hematologic/Lymphatic: Negative for bleeding problem.  Does not bruise/bleed easily.    Skin: Negative for poor would healing and rash      Physical Examination:    Vital Signs:    Vitals:    05/30/24 0850   BP: (!) 144/86   Pulse: 61       Body mass index is 34.45 kg/m².    General: No acute distress, alert and oriented, healthy appearing    HEENT: Head is atraumatic, mucous membranes are moist    Neck: Supples, no JVD    Cardiovascular: Palpable dorsalis pedis and posterior tibial pulses, regular rate and rhythm to those pulses    Lungs: Breathing non-labored    Skin: no rashes appreciated    Neurologic: Can flex and extend knees, ankles, and toes. Sensation is grossly intact    Left hip:  Range of motion of the left hip with pain and discomfort in the groin.  She is brisk cap refill disappeared sensation intact distally.  Internal and external rotation cause the patient's groin pain.  She has a positive Stinchfield to  the left hip.    X-rays:  Three views of the left hip reviewed.  Patient with total hip arthroplasty.  Femur appears to be well fixed.  Acetabulum.  It was radiolucent lines in zone 1 and 2 and likely zone 3.  MRI was reviewed really not very helpful.     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 They have tried and failed nonoperative management including: Anti-inflammatories, activity modification, physical therapy. All of these have failed to completely remove their pain. They have pain going up and down stairs as well as walking on level ground. The hip pain is affecting activities of daily living They feel that they've reached a point of disability with regards to their hip.  Patient was worked up for infection and found to be negative.  The patient would like to proceed with surgical intervention and would be a good candidate for a revision of total hip replacement with.    Revision of total hip arthroplasty procedure, alternatives, risks, and benefits were discussed in detail. The risks including but not limited to: infection, need for revision surgery, pain, swelling, loosening, injury to surrounding neurovascular structures, stiffness, incomplete resolution of pain, DVT, PE, and death were discussed in detail. Despite these risks, the patient would like to proceed with surgical intervention. All questions were answered, no guarantees made. Will plan for revision of left KARON on 6/10/24.       This note was created using [employer] voice recognition software that occasionally misinterpreted phrases or words.    Consult note is delivered via Epic messaging service.

## 2024-05-30 NOTE — PROGRESS NOTES
Chief Complaint:   Chief Complaint   Patient presents with    Pre-op Exam     pre-op revision LT hip 6/10/24////LT hip aspiration on 4/26/24. Patient doing well overall. States her pain has been radiating more towards her anterior and posterior thigh. She states this may be related to her sciatica.       History of present illness:  69 left hip.  Patient has history of total hip arthroplasty performed about 6 or 7 years ago.  She would well for about 3 or 4 years however began developing pain 2 years prior.  She continues to have significant points of pain in the left hip.  Pain is located in her groin.  It was having issues with radicular pain has been treated for this.  The groin pain has persisted.  She does have positive start-up pain.  It was difficulty ambulating on occasion.  Uses a cane.  Has tried anti-inflammatories.  Has tried activity modification.  MRI was reviewed and performed without significant abnormality noted.  As well as bone scan.  Patient has been worked up for infection with an aspiration and found to be negative.  She does feel as though she has reached a point of disability and would like to proceed with a revision of her left total hip arthroplasty    Past Medical History:   Diagnosis Date    Arthritis     Avulsion fracture of metatarsal bone of left foot     Bursitis of left hip     Heart valve regurgitation     High cholesterol     Hypertension     Thyroid disease        Past Surgical History:   Procedure Laterality Date    ARTHROCENTESIS OF HIP JOINT Left 04/26/2024    Procedure: ARTHROCENTESIS, HIP;  Surgeon: Ashkan Taylor MD;  Location: [facility];  Service: Orthopedics;  Laterality: Left;  left hip aspiration    COLONOSCOPY      CYSTOSCOPY      DILATION AND CURETTAGE OF UTERUS      JOINT REPLACEMENT  Dec 2017    Total left hip replacement    LAPAROSCOPY      RADIONUCLIDE BONE SCAN         Current Outpatient Medications   Medication Sig    atorvastatin (LIPITOR) 40 MG tablet Take  40 mg by mouth every evening.    clindamycin (CLEOCIN) 300 MG capsule Take 300 mg by mouth 3 (three) times daily as needed. Only prn for dental work    diclofenac (VOLTAREN) 75 MG EC tablet Take 75 mg by mouth 2 (two) times daily as needed.    gabapentin (NEURONTIN) 100 MG capsule Take 100 mg by mouth 2 (two) times daily.    levothyroxine (SYNTHROID) 88 MCG tablet Take 88 mcg by mouth nightly.    lisinopriL 10 MG tablet Take 10 mg by mouth nightly.    nitrofurantoin, macrocrystal-monohydrate, (MACROBID) 100 MG capsule Take 100 mg by mouth 2 (two) times daily. Only prn UTI    calcium carbonate (OS-CLAY) 600 mg calcium (1,500 mg) Tab Take 600 mg by mouth once daily. With vit D3 (400IU)    cranberry 500 mg Cap Take 1 capsule by mouth once daily.    multivitamin (ONE DAILY MULTIVITAMIN) per tablet Take 1 tablet by mouth once daily.     No current facility-administered medications for this visit.       Review of patient's allergies indicates:   Allergen Reactions    Honey     Penicillins     Shellfish containing products     Sulfa (sulfonamide antibiotics)     Wheat containing prod     Yeast     Latex, natural rubber        Family History   Problem Relation Name Age of Onset    Arthritis Mother Radha Parsons     Depression Mother Radha Janeu     Heart disease Mother Radha Parsons     Hypertension Mother Radha Janeu     Kidney disease Mother Radha Parsons     Stroke Mother Radha Parsons     Early death Father Romario Valenzuelamk     Heart disease Father Romario SALGADO Issa     Hypertension Father Romario SALGADO Issa     Arthritis Brother Tavo Valenzuelayojoss     Cancer Brother Tavo Valenzuelayou     Kidney disease Brother Tavo Valenzuelayou     Cancer Brother Bernardo JONAHArnaldo Parsons     Diabetes Brother Bernardo JONAHArnaldo Valenzuelayojoss     Heart disease Sister Cori Green     Hypertension Sister Cori Green     Miscarriages / Stillbirths Sister Cori Green        Social History     Socioeconomic History    Marital status:     Tobacco Use    Smoking status: Never    Smokeless tobacco: Never   Substance and Sexual Activity    Alcohol use: Yes     Alcohol/week: 1.0 standard drink of alcohol     Types: 1 Glasses of wine per week     Comment: Seldom    Drug use: Never    Sexual activity: Yes     Partners: Male     Birth control/protection: None           Review of Systems:    Constitution: Negative for chills, fever, and sweats.  Negative for unexplained weight loss.    HENT:  Negative for headaches and blurry vision.    Cardiovascular:Negative for chest pain or irregular heart beat. Negative for hypertension.    Respiratory:  Negative for cough and shortness of breath.    Gastrointestinal: Negative for abdominal pain, heartburn, melena, nausea, and vomitting.    Genitourinary:  Negative bladder incontinence and dysuria.    Musculoskeletal:  See HPI    Neurological: Negative for numbness.    Psychiatric/Behavioral: Negative for depression.  The patient is not nervous/anxious.      Endocrine: Negative for polyuria    Hematologic/Lymphatic: Negative for bleeding problem.  Does not bruise/bleed easily.    Skin: Negative for poor would healing and rash      Physical Examination:    Vital Signs:    Vitals:    05/30/24 0850   BP: (!) 144/86   Pulse: 61       Body mass index is 34.45 kg/m².    General: No acute distress, alert and oriented, healthy appearing    HEENT: Head is atraumatic, mucous membranes are moist    Neck: Supples, no JVD    Cardiovascular: Palpable dorsalis pedis and posterior tibial pulses, regular rate and rhythm to those pulses    Lungs: Breathing non-labored    Skin: no rashes appreciated    Neurologic: Can flex and extend knees, ankles, and toes. Sensation is grossly intact    Left hip:  Range of motion of the left hip with pain and discomfort in the groin.  She is brisk cap refill disappeared sensation intact distally.  Internal and external rotation cause the patient's groin pain.  She has a positive Stinchfield to  the left hip.    X-rays:  Three views of the left hip reviewed.  Patient with total hip arthroplasty.  Femur appears to be well fixed.  Acetabulum.  It was radiolucent lines in zone 1 and 2 and likely zone 3.  MRI was reviewed really not very helpful.     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 They have tried and failed nonoperative management including: Anti-inflammatories, activity modification, physical therapy. All of these have failed to completely remove their pain. They have pain going up and down stairs as well as walking on level ground. The hip pain is affecting activities of daily living They feel that they've reached a point of disability with regards to their hip.  Patient was worked up for infection and found to be negative.  The patient would like to proceed with surgical intervention and would be a good candidate for a revision of total hip replacement with.    Revision of total hip arthroplasty procedure, alternatives, risks, and benefits were discussed in detail. The risks including but not limited to: infection, need for revision surgery, pain, swelling, loosening, injury to surrounding neurovascular structures, stiffness, incomplete resolution of pain, DVT, PE, and death were discussed in detail. Despite these risks, the patient would like to proceed with surgical intervention. All questions were answered, no guarantees made. Will plan for revision of left KARON on 6/10/24.       This note was created using [employer] voice recognition software that occasionally misinterpreted phrases or words.    Consult note is delivered via Epic messaging service.

## 2024-06-05 ENCOUNTER — TELEPHONE (OUTPATIENT)
Dept: ORTHOPEDICS | Facility: CLINIC | Age: 69
End: 2024-06-05
Payer: MEDICARE

## 2024-06-05 NOTE — TELEPHONE ENCOUNTER
Spoke with patient, she called asking for PT orders to be sent to [facility]. Informed her that I faxed them her information and they should get it some time today. Patient voiced understanding and had no further questions.

## 2024-06-10 ENCOUNTER — HOSPITAL ENCOUNTER (INPATIENT)
Facility: HOSPITAL | Age: 69
LOS: 1 days | Discharge: HOME | DRG: 468 | End: 2024-06-11
Attending: ORTHOPAEDIC SURGERY | Admitting: ORTHOPAEDIC SURGERY
Payer: MEDICARE

## 2024-06-10 ENCOUNTER — ANESTHESIA (OUTPATIENT)
Dept: SURGERY | Facility: HOSPITAL | Age: 69
DRG: 468 | End: 2024-06-10
Payer: MEDICARE

## 2024-06-10 DIAGNOSIS — Z96.642 HX OF TOTAL HIP ARTHROPLASTY, LEFT: ICD-10-CM

## 2024-06-10 DIAGNOSIS — T84.018A FAILED TOTAL HIP ARTHROPLASTY, INITIAL ENCOUNTER: ICD-10-CM

## 2024-06-10 DIAGNOSIS — Z96.649 FAILED TOTAL HIP ARTHROPLASTY, INITIAL ENCOUNTER: ICD-10-CM

## 2024-06-10 DIAGNOSIS — T84.019A FAILED TOTAL JOINT REPLACEMENT: ICD-10-CM

## 2024-06-10 DIAGNOSIS — Z01.818 PREOPERATIVE TESTING: ICD-10-CM

## 2024-06-10 DIAGNOSIS — R79.9 ABNORMAL BLOOD CHEMISTRY LEVEL: ICD-10-CM

## 2024-06-10 LAB
GRAM STN SPEC: NORMAL
GRAM STN SPEC: NORMAL
HCT VFR BLD AUTO: 41 % (ref 37–47)
HGB BLD-MCNC: 13.3 G/DL (ref 12–16)
POCT GLUCOSE: 94 MG/DL (ref 70–110)

## 2024-06-10 PROCEDURE — 36415 COLL VENOUS BLD VENIPUNCTURE: CPT | Performed by: ORTHOPAEDIC SURGERY

## 2024-06-10 PROCEDURE — 27800903 OPTIME MED/SURG SUP & DEVICES OTHER IMPLANTS: Performed by: ORTHOPAEDIC SURGERY

## 2024-06-10 PROCEDURE — 99900035 HC TECH TIME PER 15 MIN (STAT)

## 2024-06-10 PROCEDURE — 94799 UNLISTED PULMONARY SVC/PX: CPT | Mod: XB

## 2024-06-10 PROCEDURE — 37000009 HC ANESTHESIA EA ADD 15 MINS: Performed by: ORTHOPAEDIC SURGERY

## 2024-06-10 PROCEDURE — 97162 PT EVAL MOD COMPLEX 30 MIN: CPT

## 2024-06-10 PROCEDURE — 63600175 PHARM REV CODE 636 W HCPCS: Performed by: ORTHOPAEDIC SURGERY

## 2024-06-10 PROCEDURE — 88305 TISSUE EXAM BY PATHOLOGIST: CPT | Performed by: ORTHOPAEDIC SURGERY

## 2024-06-10 PROCEDURE — D9220A PRA ANESTHESIA: Mod: CRNA,,, | Performed by: NURSE ANESTHETIST, CERTIFIED REGISTERED

## 2024-06-10 PROCEDURE — 99900031 HC PATIENT EDUCATION (STAT)

## 2024-06-10 PROCEDURE — 0SPB0JZ REMOVAL OF SYNTHETIC SUBSTITUTE FROM LEFT HIP JOINT, OPEN APPROACH: ICD-10-PCS | Performed by: ORTHOPAEDIC SURGERY

## 2024-06-10 PROCEDURE — 27137 REVISE HIP JOINT REPLACEMENT: CPT | Mod: 52,LT,, | Performed by: ORTHOPAEDIC SURGERY

## 2024-06-10 PROCEDURE — 25000003 PHARM REV CODE 250: Performed by: ORTHOPAEDIC SURGERY

## 2024-06-10 PROCEDURE — C1776 JOINT DEVICE (IMPLANTABLE): HCPCS | Performed by: ORTHOPAEDIC SURGERY

## 2024-06-10 PROCEDURE — 11000001 HC ACUTE MED/SURG PRIVATE ROOM

## 2024-06-10 PROCEDURE — 94761 N-INVAS EAR/PLS OXIMETRY MLT: CPT

## 2024-06-10 PROCEDURE — 37000008 HC ANESTHESIA 1ST 15 MINUTES: Performed by: ORTHOPAEDIC SURGERY

## 2024-06-10 PROCEDURE — 0SRB01A REPLACEMENT OF LEFT HIP JOINT WITH METAL SYNTHETIC SUBSTITUTE, UNCEMENTED, OPEN APPROACH: ICD-10-PCS | Performed by: ORTHOPAEDIC SURGERY

## 2024-06-10 PROCEDURE — 88300 SURGICAL PATH GROSS: CPT

## 2024-06-10 PROCEDURE — 25000003 PHARM REV CODE 250: Performed by: NURSE PRACTITIONER

## 2024-06-10 PROCEDURE — 88331 PATH CONSLTJ SURG 1 BLK 1SPC: CPT

## 2024-06-10 PROCEDURE — C1713 ANCHOR/SCREW BN/BN,TIS/BN: HCPCS | Performed by: ORTHOPAEDIC SURGERY

## 2024-06-10 PROCEDURE — 27000221 HC OXYGEN, UP TO 24 HOURS

## 2024-06-10 PROCEDURE — 27137 REVISE HIP JOINT REPLACEMENT: CPT | Mod: AS,52,LT, | Performed by: NURSE PRACTITIONER

## 2024-06-10 PROCEDURE — 51798 US URINE CAPACITY MEASURE: CPT

## 2024-06-10 PROCEDURE — 63600175 PHARM REV CODE 636 W HCPCS: Performed by: NURSE ANESTHETIST, CERTIFIED REGISTERED

## 2024-06-10 PROCEDURE — 85018 HEMOGLOBIN: CPT | Performed by: ORTHOPAEDIC SURGERY

## 2024-06-10 PROCEDURE — 82962 GLUCOSE BLOOD TEST: CPT | Performed by: ORTHOPAEDIC SURGERY

## 2024-06-10 PROCEDURE — 25000003 PHARM REV CODE 250: Performed by: NURSE ANESTHETIST, CERTIFIED REGISTERED

## 2024-06-10 PROCEDURE — 87070 CULTURE OTHR SPECIMN AEROBIC: CPT | Performed by: ORTHOPAEDIC SURGERY

## 2024-06-10 PROCEDURE — 36000711: Performed by: ORTHOPAEDIC SURGERY

## 2024-06-10 PROCEDURE — 36000710: Performed by: ORTHOPAEDIC SURGERY

## 2024-06-10 PROCEDURE — 87205 SMEAR GRAM STAIN: CPT | Performed by: ORTHOPAEDIC SURGERY

## 2024-06-10 PROCEDURE — 87075 CULTR BACTERIA EXCEPT BLOOD: CPT | Performed by: ORTHOPAEDIC SURGERY

## 2024-06-10 PROCEDURE — 27201423 OPTIME MED/SURG SUP & DEVICES STERILE SUPPLY: Performed by: ORTHOPAEDIC SURGERY

## 2024-06-10 PROCEDURE — 71000033 HC RECOVERY, INTIAL HOUR: Performed by: ORTHOPAEDIC SURGERY

## 2024-06-10 PROCEDURE — D9220A PRA ANESTHESIA: Mod: ANES,,, | Performed by: STUDENT IN AN ORGANIZED HEALTH CARE EDUCATION/TRAINING PROGRAM

## 2024-06-10 DEVICE — V40 FEMORAL HEAD
Type: IMPLANTABLE DEVICE | Site: HIP | Status: FUNCTIONAL
Brand: LFIT

## 2024-06-10 DEVICE — 6.5MM LOW PROFILE HEX SCREW 40MM
Type: IMPLANTABLE DEVICE | Site: HIP | Status: FUNCTIONAL
Brand: TRIDENT II

## 2024-06-10 DEVICE — GRAFT CHIPS FD 4-10MM 15CC: Type: IMPLANTABLE DEVICE | Site: HIP | Status: FUNCTIONAL

## 2024-06-10 DEVICE — TRIDENT X3 10 DEGREE POLYETHYLENE INSERT
Type: IMPLANTABLE DEVICE | Site: HIP | Status: FUNCTIONAL
Brand: TRIDENT X3 INSERT

## 2024-06-10 DEVICE — 6.5MM LOW PROFILE HEX SCREW 30MM
Type: IMPLANTABLE DEVICE | Site: HIP | Status: FUNCTIONAL
Brand: TRIDENT II

## 2024-06-10 DEVICE — 6.5MM LOW PROFILE HEX SCREW 20MM
Type: IMPLANTABLE DEVICE | Site: HIP | Status: FUNCTIONAL
Brand: TRIDENT II

## 2024-06-10 DEVICE — TRIDENT II TRITANIUM MULTIHOLE ACETABULAR SHELL 56F
Type: IMPLANTABLE DEVICE | Site: HIP | Status: FUNCTIONAL
Brand: TRIDENT II

## 2024-06-10 DEVICE — 6.5MM LOW PROFILE HEX SCREW 35MM
Type: IMPLANTABLE DEVICE | Site: HIP | Status: FUNCTIONAL
Brand: TRIDENT II

## 2024-06-10 RX ORDER — LEVOTHYROXINE SODIUM 88 UG/1
88 TABLET ORAL NIGHTLY
Status: DISCONTINUED | OUTPATIENT
Start: 2024-06-10 | End: 2024-06-11 | Stop reason: HOSPADM

## 2024-06-10 RX ORDER — ACETAMINOPHEN 500 MG
500 TABLET ORAL EVERY 4 HOURS
Status: DISCONTINUED | OUTPATIENT
Start: 2024-06-11 | End: 2024-06-11 | Stop reason: HOSPADM

## 2024-06-10 RX ORDER — TALC
6 POWDER (GRAM) TOPICAL NIGHTLY PRN
Status: DISCONTINUED | OUTPATIENT
Start: 2024-06-10 | End: 2024-06-11 | Stop reason: HOSPADM

## 2024-06-10 RX ORDER — PHENYLEPHRINE HYDROCHLORIDE 10 MG/ML
INJECTION INTRAVENOUS CONTINUOUS PRN
Status: DISCONTINUED | OUTPATIENT
Start: 2024-06-10 | End: 2024-06-10

## 2024-06-10 RX ORDER — GABAPENTIN 300 MG/1
300 CAPSULE ORAL
Status: COMPLETED | OUTPATIENT
Start: 2024-06-10 | End: 2024-06-10

## 2024-06-10 RX ORDER — ONDANSETRON HYDROCHLORIDE 2 MG/ML
4 INJECTION, SOLUTION INTRAVENOUS EVERY 6 HOURS PRN
Status: DISCONTINUED | OUTPATIENT
Start: 2024-06-10 | End: 2024-06-11 | Stop reason: HOSPADM

## 2024-06-10 RX ORDER — VANCOMYCIN HYDROCHLORIDE 1 G/20ML
INJECTION, POWDER, LYOPHILIZED, FOR SOLUTION INTRAVENOUS
Status: DISCONTINUED | OUTPATIENT
Start: 2024-06-10 | End: 2024-06-10 | Stop reason: HOSPADM

## 2024-06-10 RX ORDER — ONDANSETRON HYDROCHLORIDE 2 MG/ML
INJECTION, SOLUTION INTRAVENOUS
Status: DISCONTINUED | OUTPATIENT
Start: 2024-06-10 | End: 2024-06-10

## 2024-06-10 RX ORDER — METHOCARBAMOL 750 MG/1
750 TABLET, FILM COATED ORAL EVERY 8 HOURS PRN
Status: DISCONTINUED | OUTPATIENT
Start: 2024-06-10 | End: 2024-06-11 | Stop reason: HOSPADM

## 2024-06-10 RX ORDER — BISACODYL 10 MG/1
10 SUPPOSITORY RECTAL DAILY
Status: DISCONTINUED | OUTPATIENT
Start: 2024-06-13 | End: 2024-06-11 | Stop reason: HOSPADM

## 2024-06-10 RX ORDER — POLYETHYLENE GLYCOL 3350 17 G/17G
17 POWDER, FOR SOLUTION ORAL NIGHTLY
Status: DISCONTINUED | OUTPATIENT
Start: 2024-06-10 | End: 2024-06-11 | Stop reason: HOSPADM

## 2024-06-10 RX ORDER — METOCLOPRAMIDE HYDROCHLORIDE 5 MG/ML
10 INJECTION INTRAMUSCULAR; INTRAVENOUS
Status: DISCONTINUED | OUTPATIENT
Start: 2024-06-10 | End: 2024-06-10

## 2024-06-10 RX ORDER — LISINOPRIL 10 MG/1
10 TABLET ORAL NIGHTLY
Status: DISCONTINUED | OUTPATIENT
Start: 2024-06-10 | End: 2024-06-11 | Stop reason: HOSPADM

## 2024-06-10 RX ORDER — PROPOFOL 10 MG/ML
VIAL (ML) INTRAVENOUS CONTINUOUS PRN
Status: DISCONTINUED | OUTPATIENT
Start: 2024-06-10 | End: 2024-06-10

## 2024-06-10 RX ORDER — DEXAMETHASONE SODIUM PHOSPHATE 4 MG/ML
INJECTION, SOLUTION INTRA-ARTICULAR; INTRALESIONAL; INTRAMUSCULAR; INTRAVENOUS; SOFT TISSUE
Status: DISCONTINUED | OUTPATIENT
Start: 2024-06-10 | End: 2024-06-10

## 2024-06-10 RX ORDER — NAPROXEN SODIUM 220 MG/1
81 TABLET, FILM COATED ORAL 2 TIMES DAILY
Status: DISCONTINUED | OUTPATIENT
Start: 2024-06-11 | End: 2024-06-11 | Stop reason: HOSPADM

## 2024-06-10 RX ORDER — LIDOCAINE HYDROCHLORIDE 10 MG/ML
INJECTION, SOLUTION EPIDURAL; INFILTRATION; INTRACAUDAL; PERINEURAL
Status: DISCONTINUED | OUTPATIENT
Start: 2024-06-10 | End: 2024-06-10

## 2024-06-10 RX ORDER — KETOROLAC TROMETHAMINE 30 MG/ML
15 INJECTION, SOLUTION INTRAMUSCULAR; INTRAVENOUS EVERY 6 HOURS
Status: DISCONTINUED | OUTPATIENT
Start: 2024-06-10 | End: 2024-06-11 | Stop reason: HOSPADM

## 2024-06-10 RX ORDER — GABAPENTIN 300 MG/1
300 CAPSULE ORAL NIGHTLY
Status: DISCONTINUED | OUTPATIENT
Start: 2024-06-10 | End: 2024-06-11 | Stop reason: HOSPADM

## 2024-06-10 RX ORDER — HYDROCODONE BITARTRATE AND ACETAMINOPHEN 5; 325 MG/1; MG/1
1 TABLET ORAL EVERY 4 HOURS PRN
Status: DISCONTINUED | OUTPATIENT
Start: 2024-06-10 | End: 2024-06-11 | Stop reason: HOSPADM

## 2024-06-10 RX ORDER — ACETAMINOPHEN 10 MG/ML
1000 INJECTION, SOLUTION INTRAVENOUS ONCE
Status: COMPLETED | OUTPATIENT
Start: 2024-06-10 | End: 2024-06-10

## 2024-06-10 RX ORDER — ONDANSETRON 4 MG/1
4 TABLET, ORALLY DISINTEGRATING ORAL
Status: COMPLETED | OUTPATIENT
Start: 2024-06-10 | End: 2024-06-10

## 2024-06-10 RX ORDER — ALUMINUM HYDROXIDE, MAGNESIUM HYDROXIDE, AND SIMETHICONE 1200; 120; 1200 MG/30ML; MG/30ML; MG/30ML
30 SUSPENSION ORAL EVERY 6 HOURS PRN
Status: DISCONTINUED | OUTPATIENT
Start: 2024-06-10 | End: 2024-06-11 | Stop reason: HOSPADM

## 2024-06-10 RX ORDER — FAMOTIDINE 20 MG/1
20 TABLET, FILM COATED ORAL 2 TIMES DAILY
Status: DISCONTINUED | OUTPATIENT
Start: 2024-06-10 | End: 2024-06-11 | Stop reason: HOSPADM

## 2024-06-10 RX ORDER — VANCOMYCIN HYDROCHLORIDE 1 G/20ML
INJECTION, POWDER, LYOPHILIZED, FOR SOLUTION INTRAVENOUS
Status: DISPENSED
Start: 2024-06-10 | End: 2024-06-10

## 2024-06-10 RX ORDER — SCOLOPAMINE TRANSDERMAL SYSTEM 1 MG/1
1 PATCH, EXTENDED RELEASE TRANSDERMAL ONCE AS NEEDED
Status: DISCONTINUED | OUTPATIENT
Start: 2024-06-10 | End: 2024-06-10

## 2024-06-10 RX ORDER — SODIUM CHLORIDE 9 MG/ML
INJECTION, SOLUTION INTRAVENOUS CONTINUOUS
Status: DISCONTINUED | OUTPATIENT
Start: 2024-06-10 | End: 2024-06-11 | Stop reason: HOSPADM

## 2024-06-10 RX ORDER — MIDAZOLAM HYDROCHLORIDE 1 MG/ML
INJECTION INTRAMUSCULAR; INTRAVENOUS
Status: DISCONTINUED | OUTPATIENT
Start: 2024-06-10 | End: 2024-06-10

## 2024-06-10 RX ORDER — KETOROLAC TROMETHAMINE 10 MG/1
10 TABLET, FILM COATED ORAL
Status: COMPLETED | OUTPATIENT
Start: 2024-06-10 | End: 2024-06-10

## 2024-06-10 RX ORDER — EPHEDRINE SULFATE 50 MG/ML
INJECTION, SOLUTION INTRAVENOUS
Status: DISCONTINUED | OUTPATIENT
Start: 2024-06-10 | End: 2024-06-10

## 2024-06-10 RX ORDER — SODIUM CHLORIDE, SODIUM GLUCONATE, SODIUM ACETATE, POTASSIUM CHLORIDE AND MAGNESIUM CHLORIDE 30; 37; 368; 526; 502 MG/100ML; MG/100ML; MG/100ML; MG/100ML; MG/100ML
INJECTION, SOLUTION INTRAVENOUS CONTINUOUS
Status: DISCONTINUED | OUTPATIENT
Start: 2024-06-10 | End: 2024-06-10

## 2024-06-10 RX ORDER — AMOXICILLIN 250 MG
2 CAPSULE ORAL 2 TIMES DAILY
Status: DISCONTINUED | OUTPATIENT
Start: 2024-06-10 | End: 2024-06-11 | Stop reason: HOSPADM

## 2024-06-10 RX ORDER — BUPIVACAINE HYDROCHLORIDE 7.5 MG/ML
INJECTION, SOLUTION EPIDURAL; RETROBULBAR
Status: COMPLETED | OUTPATIENT
Start: 2024-06-10 | End: 2024-06-10

## 2024-06-10 RX ORDER — MORPHINE SULFATE 4 MG/ML
4 INJECTION, SOLUTION INTRAMUSCULAR; INTRAVENOUS
Status: DISCONTINUED | OUTPATIENT
Start: 2024-06-10 | End: 2024-06-11 | Stop reason: HOSPADM

## 2024-06-10 RX ORDER — ESMOLOL HYDROCHLORIDE 10 MG/ML
INJECTION INTRAVENOUS
Status: DISCONTINUED | OUTPATIENT
Start: 2024-06-10 | End: 2024-06-10

## 2024-06-10 RX ORDER — TRANEXAMIC ACID 650 MG/1
1950 TABLET ORAL
Status: COMPLETED | OUTPATIENT
Start: 2024-06-10 | End: 2024-06-10

## 2024-06-10 RX ORDER — TRAMADOL HYDROCHLORIDE 50 MG/1
50 TABLET ORAL EVERY 4 HOURS PRN
Status: DISCONTINUED | OUTPATIENT
Start: 2024-06-10 | End: 2024-06-11 | Stop reason: HOSPADM

## 2024-06-10 RX ORDER — DOCUSATE SODIUM 100 MG/1
200 CAPSULE, LIQUID FILLED ORAL DAILY
Status: DISCONTINUED | OUTPATIENT
Start: 2024-06-11 | End: 2024-06-11 | Stop reason: HOSPADM

## 2024-06-10 RX ORDER — METOCLOPRAMIDE 10 MG/1
10 TABLET ORAL EVERY 6 HOURS
Status: DISCONTINUED | OUTPATIENT
Start: 2024-06-10 | End: 2024-06-11 | Stop reason: HOSPADM

## 2024-06-10 RX ORDER — ACETAMINOPHEN 500 MG
1000 TABLET ORAL
Status: COMPLETED | OUTPATIENT
Start: 2024-06-10 | End: 2024-06-10

## 2024-06-10 RX ORDER — LACTULOSE 10 G/15ML
20 SOLUTION ORAL EVERY 6 HOURS PRN
Status: DISCONTINUED | OUTPATIENT
Start: 2024-06-10 | End: 2024-06-11 | Stop reason: HOSPADM

## 2024-06-10 RX ADMIN — EPHEDRINE SULFATE 10 MG: 50 INJECTION INTRAVENOUS at 10:06

## 2024-06-10 RX ADMIN — GABAPENTIN 300 MG: 300 CAPSULE ORAL at 08:06

## 2024-06-10 RX ADMIN — TRAMADOL HYDROCHLORIDE 50 MG: 50 TABLET, COATED ORAL at 08:06

## 2024-06-10 RX ADMIN — Medication 6 MG: at 10:06

## 2024-06-10 RX ADMIN — LEVOTHYROXINE SODIUM 88 MCG: 88 TABLET ORAL at 08:06

## 2024-06-10 RX ADMIN — CEFAZOLIN 2 G: 2 INJECTION, POWDER, FOR SOLUTION INTRAMUSCULAR; INTRAVENOUS at 10:06

## 2024-06-10 RX ADMIN — KETOROLAC TROMETHAMINE 10 MG: 10 TABLET, FILM COATED ORAL at 08:06

## 2024-06-10 RX ADMIN — DEXAMETHASONE SODIUM PHOSPHATE 8 MG: 4 INJECTION, SOLUTION INTRA-ARTICULAR; INTRALESIONAL; INTRAMUSCULAR; INTRAVENOUS; SOFT TISSUE at 10:06

## 2024-06-10 RX ADMIN — ACETAMINOPHEN 1000 MG: 10 INJECTION INTRAVENOUS at 08:06

## 2024-06-10 RX ADMIN — SODIUM CHLORIDE, SODIUM GLUCONATE, SODIUM ACETATE, POTASSIUM CHLORIDE AND MAGNESIUM CHLORIDE: 526; 502; 368; 37; 30 INJECTION, SOLUTION INTRAVENOUS at 08:06

## 2024-06-10 RX ADMIN — POLYETHYLENE GLYCOL 3350 17 G: 17 POWDER, FOR SOLUTION ORAL at 08:06

## 2024-06-10 RX ADMIN — DEXTROSE 2 G: 50 INJECTION, SOLUTION INTRAVENOUS at 09:06

## 2024-06-10 RX ADMIN — BUPIVACAINE HYDROCHLORIDE 1.8 ML: 7.5 INJECTION, SOLUTION EPIDURAL; RETROBULBAR at 10:06

## 2024-06-10 RX ADMIN — ACETAMINOPHEN 1000 MG: 500 TABLET ORAL at 08:06

## 2024-06-10 RX ADMIN — ESMOLOL HYDROCHLORIDE 20 MG: 100 INJECTION, SOLUTION INTRAVENOUS at 10:06

## 2024-06-10 RX ADMIN — ONDANSETRON HYDROCHLORIDE 4 MG: 2 SOLUTION INTRAMUSCULAR; INTRAVENOUS at 11:06

## 2024-06-10 RX ADMIN — FAMOTIDINE 20 MG: 20 TABLET, FILM COATED ORAL at 08:06

## 2024-06-10 RX ADMIN — SODIUM CHLORIDE, SODIUM GLUCONATE, SODIUM ACETATE, POTASSIUM CHLORIDE AND MAGNESIUM CHLORIDE: 526; 502; 368; 37; 30 INJECTION, SOLUTION INTRAVENOUS at 09:06

## 2024-06-10 RX ADMIN — KETOROLAC TROMETHAMINE 15 MG: 30 INJECTION, SOLUTION INTRAMUSCULAR at 08:06

## 2024-06-10 RX ADMIN — METOCLOPRAMIDE 10 MG: 10 TABLET ORAL at 04:06

## 2024-06-10 RX ADMIN — LISINOPRIL 10 MG: 10 TABLET ORAL at 08:06

## 2024-06-10 RX ADMIN — CEFAZOLIN 2 G: 2 INJECTION, POWDER, FOR SOLUTION INTRAMUSCULAR; INTRAVENOUS at 03:06

## 2024-06-10 RX ADMIN — ONDANSETRON 4 MG: 4 TABLET, ORALLY DISINTEGRATING ORAL at 08:06

## 2024-06-10 RX ADMIN — PROPOFOL 50 MCG/KG/MIN: 10 INJECTION, EMULSION INTRAVENOUS at 10:06

## 2024-06-10 RX ADMIN — TRANEXAMIC ACID 1950 MG: 650 TABLET ORAL at 08:06

## 2024-06-10 RX ADMIN — LIDOCAINE HYDROCHLORIDE 50 MG: 10 INJECTION, SOLUTION EPIDURAL; INFILTRATION; INTRACAUDAL; PERINEURAL at 10:06

## 2024-06-10 RX ADMIN — MIDAZOLAM 2 MG: 1 INJECTION INTRAMUSCULAR; INTRAVENOUS at 09:06

## 2024-06-10 RX ADMIN — EPHEDRINE SULFATE 10 MG: 50 INJECTION INTRAVENOUS at 11:06

## 2024-06-10 RX ADMIN — PHENYLEPHRINE HYDROCHLORIDE 0.2 MCG/KG/MIN: 10 INJECTION INTRAVENOUS at 10:06

## 2024-06-10 RX ADMIN — SENNOSIDES AND DOCUSATE SODIUM 2 TABLET: 8.6; 5 TABLET ORAL at 08:06

## 2024-06-10 NOTE — ANESTHESIA PREPROCEDURE EVALUATION
06/10/2024  Radha Alexandre is a 69 y.o., female.    14 / 44 / 379k  Na 143, K 4.0, Cr 0.70  Ecg nsr    Lumbar MRI with significant ligamentum flavum hypertrophy    Pre-op Assessment    I have reviewed the Patient Summary Reports.     I have reviewed the Nursing Notes. I have reviewed the NPO Status.   I have reviewed the Medications.     Review of Systems  Anesthesia Hx:               Denies Personal Hx of Anesthesia complications.                    Cardiovascular:     Hypertension                                        Pulmonary:  Pulmonary Normal                       Hepatic/GI:  Hepatic/GI Normal                 Neurological:  Neurology Normal                                      Endocrine:        Obesity / BMI > 30      Physical Exam  General: Well nourished, Cooperative and Alert    Airway:  Mallampati: II   Mouth Opening: Normal  TM Distance: Normal  Tongue: Normal    Dental:  Intact        Anesthesia Plan  Type of Anesthesia, risks & benefits discussed:    Anesthesia Type: Spinal  Intra-op Monitoring Plan: Standard ASA Monitors  Post Op Pain Control Plan: multimodal analgesia  Induction:  IV  Informed Consent: Informed consent signed with the Patient and all parties understand the risks and agree with anesthesia plan.  All questions answered.   ASA Score: 2  Day of Surgery Review of History & Physical: H&P Update referred to the surgeon/provider.    Ready For Surgery From Anesthesia Perspective.     .

## 2024-06-10 NOTE — ANESTHESIA POSTPROCEDURE EVALUATION
Anesthesia Post Evaluation    Patient: Radha Alexandre    Procedure(s) Performed: Procedure(s) (LRB):  REVISION, TOTAL ARTHROPLASTY, HIP - ACETABULUM / PATHOLOGY/ SARAH STEM/ CELL SAVER (Left)    Final Anesthesia Type: spinal      Patient location during evaluation: PACU  Patient participation: Yes- Able to Participate  Level of consciousness: awake and alert  Post-procedure vital signs: reviewed and stable  Pain management: adequate  Airway patency: patent    PONV status at discharge: No PONV  Anesthetic complications: no      Respiratory status: unassisted  Hydration status: euvolemic  Follow-up not needed.              Vitals Value Taken Time   /56 06/10/24 1155   Temp nl 06/10/24 1158   Pulse 91 06/10/24 1156   Resp 23 06/10/24 1156   SpO2 99 % 06/10/24 1156   Vitals shown include unfiled device data.      No case tracking events are documented in the log.      Pain/Silvestre Score: Pain Rating Prior to Med Admin: 2 (6/10/2024  8:05 AM)

## 2024-06-10 NOTE — BRIEF OP NOTE
[facility] Orthopaedics - Periop Services  Brief Operative Note    SUMMARY     Surgery Date: 6/10/24     Surgeon(s) and Role:     * Ashkan Taylor MD - Primary    First Assist - Sheyla Terrazas NP    Pre-op Diagnosis:    Post-Op Diagnosis Codes:     * Failed total hip arthroplasty, initial encounter [T84.018A, Z96.649]    Post-op Diagnosis:    Post-Op Diagnosis Codes:     * Failed total hip arthroplasty, initial encounter [T84.018A, Z96.649]    Procedure:  Revision L KARON - acetabulum only    Anesthesia: See Anesthesia Note    Operative Findings: see separately dictated op note    Estimated Blood Loss: 150 ml         Specimens:   Specimen (24h ago, onward)       Start     Ordered    06/10/24 1031  Specimen to Pathology  RELEASE UPON ORDERING        References:    Click here for ordering Quick Tip   Question:  Release to patient  Answer:  Immediate    06/10/24 1031                    [id number]

## 2024-06-10 NOTE — NURSING
Nurses Note -- 4 Eyes      6/10/2024   2:36 PM      Skin assessed during: Admit      [x] No Altered Skin Integrity Present    [x]Prevention Measures Documented      [] Yes- Altered Skin Integrity Present or Discovered   [] LDA Added if Not in Epic (Describe Wound)   [] New Altered Skin Integrity was Present on Admit and Documented in LDA   [] Wound Image Taken    Wound Care Consulted? No    Attending Nurse:  Sheyla Ely RN/Staff Member:   Rama

## 2024-06-10 NOTE — PT/OT/SLP EVAL
Physical Therapy Evaluation    Patient Name:  Radha Alexandre   MRN:  [id number]    Recommendations:     Discharge Recommendations: Low Intensity Therapy   Discharge Equipment Recommendations: walker, rolling   Barriers to discharge: None    Assessment:     Radha Alexandre is a 69 y.o. female admitted with a medical diagnosis of Failed total hip arthroplasty, initial encounter.  She presents with the following impairments/functional limitations: weakness, impaired endurance, impaired functional mobility, decreased lower extremity function, pain, decreased ROM, edema, orthopedic precautions .    Rehab Prognosis: Good; patient would benefit from acute skilled PT services to address these deficits and reach maximum level of function.    Recent Surgery: Procedure(s) (LRB):  REVISION, TOTAL ARTHROPLASTY, HIP - ACETABULUM / PATHOLOGY/ SARAH STEM/ CELL SAVER (Left) Day of Surgery    Plan:     During this hospitalization, patient to be seen BID to address the identified rehab impairments via gait training, therapeutic activities, therapeutic exercises and progress toward the following goals:    Plan of Care Expires:  06/14/24    Subjective     Chief Complaint: L hip pain  Patient/Family Comments/goals:   Pain/Comfort:  Location - Side 1: Left  Location 1: hip  Pain Addressed 1: Pre-medicate for activity, Reposition, Cessation of Activity, Distraction    Patients cultural, spiritual, Druze conflicts given the current situation:      Living Environment:  Pt lives in two story home with , 6 steps with bilateral HR. Pt reports her steps are wide and will need to practice with one HR   Prior to admission, patients level of function was mod ind.  Equipment used at home: walker, standard.  DME owned (not currently used): none.  Upon discharge, patient will have assistance from .    Objective:     Communicated with nurse prior to session.  Patient found supine with peripheral IV  upon PT entry to room.    General  Precautions: Standard, fall  Orthopedic Precautions:LLE weight bearing as tolerated, LLE posterior precautions   Braces:    Respiratory Status: Room air    Exams:  RLE ROM: WFL  RLE Strength: WFL  LLE ROM: NT dt sx side  LLE Strength: NT dt sx side    Functional Mobility:  Bed Mobility:     Supine to Sit: stand by assistance  Transfers:     Sit to Stand:  contact guard assistance with rolling walker  Bed to Chair: minimum assistance with  rolling walker  using  Step Transfer; pt unable to cont. Ambulation due to numbness in LEs          Treatment & Education:  Pt edu on total hip precautions, WB status, and importance of frequent mobility  Pt completed prehab prior to sx    Patient left up in chair with all lines intact, call button in reach, nurse notified, and  present.    GOALS:   Multidisciplinary Problems       Physical Therapy Goals          Problem: Physical Therapy    Goal Priority Disciplines Outcome Goal Variances Interventions   Physical Therapy Goal     PT, PT/OT Progressing     Description: Pt will improve functional independence by performing:    Bed mobility: SBA  Sit to stand: SBA with rolling walker  Bed to chair t/f: SBA with Stand Step  with rolling walker  Ambulation x 200'  with SBA with rolling walker  1 Step (Curb): Min A  with rolling walker  6 Steps: Min A  with B HR   Independent with total hip HEP                        History:     Past Medical History:   Diagnosis Date    Arthritis     Avulsion fracture of metatarsal bone of left foot     Bursitis of left hip     Heart valve regurgitation     High cholesterol     Hypertension     Thyroid disease        Past Surgical History:   Procedure Laterality Date    ARTHROCENTESIS OF HIP JOINT Left 04/26/2024    Procedure: ARTHROCENTESIS, HIP;  Surgeon: Ashkan Taylor MD;  Location: [facility];  Service: Orthopedics;  Laterality: Left;  left hip aspiration    COLONOSCOPY      CYSTOSCOPY      DILATION AND CURETTAGE OF UTERUS      JOINT  REPLACEMENT  Dec 2017    Total left hip replacement    LAPAROSCOPY      RADIONUCLIDE BONE SCAN         Time Tracking:     PT Received On:    PT Start Time: 1456     PT Stop Time: 1517  PT Total Time (min): 21 min     Billable Minutes: Evaluation 21      06/10/2024

## 2024-06-10 NOTE — TRANSFER OF CARE
"Anesthesia Transfer of Care Note    Patient: Radha Alexandre    Procedure(s) Performed: Procedure(s) (LRB):  REVISION, TOTAL ARTHROPLASTY, HIP - ACETABULUM / PATHOLOGY/ SARAH STEM/ CELL SAVER (Left)    Patient location: PACU    Anesthesia Type: general    Transport from OR: Transported from OR on room air with adequate spontaneous ventilation    Post pain: adequate analgesia    Post assessment: no apparent anesthetic complications    Post vital signs: stable    Level of consciousness: awake    Nausea/Vomiting: no nausea/vomiting    Complications: none    Transfer of care protocol was followed      Last vitals: Visit Vitals  /73   Pulse 72   Temp 36.4 °C (97.6 °F) (Tympanic)   Resp 20   Ht 5' 5" (1.651 m)   Wt 92.6 kg (204 lb 2.3 oz)   SpO2 98%   Breastfeeding No   BMI 33.97 kg/m²     "

## 2024-06-10 NOTE — PLAN OF CARE
Problem: Physical Therapy  Goal: Physical Therapy Goal  Description: Pt will improve functional independence by performing:    Bed mobility: SBA  Sit to stand: SBA with rolling walker  Bed to chair t/f: SBA with Stand Step  with rolling walker  Ambulation x 200'  with SBA with rolling walker  1 Step (Curb): Min A  with rolling walker  6 Steps: Min A  with B HR   Independent with total hip HEP   Outcome: Progressing

## 2024-06-10 NOTE — ANESTHESIA PROCEDURE NOTES
Spinal    Diagnosis: Osteoarthritis  Patient location during procedure: OR  Start time: 6/10/2024 9:50 AM  Timeout: 6/10/2024 9:50 AM  End time: 6/10/2024 10:00 AM    Staffing  Authorizing Provider: Missael Vallejo MD  Performing Provider: Missael Vallejo MD    Staffing  Performed by: Missael Vallejo MD  Authorized by: Missael Vallejo MD    Preanesthetic Checklist  Completed: patient identified, IV checked, site marked, risks and benefits discussed, surgical consent, monitors and equipment checked, pre-op evaluation and timeout performed  Spinal Block  Patient position: sitting  Prep: ChloraPrep  Patient monitoring: heart rate, continuous pulse ox, frequent blood pressure checks and cardiac monitor  Approach: midline  Location: L3-4  Injection technique: single shot  CSF Fluid: clear free-flowing CSF  Needle  Needle type: Quincke   Needle gauge: 22 G  Needle length: 3.5 in  Additional Documentation: incremental injection, negative aspiration for heme and no paresthesia on injection  Needle localization: anatomical landmarks  Assessment  Ease of block: moderate  Patient's tolerance of the procedure: comfortable throughout block and no complaints  Additional Notes  Required two levels, frequent os contact, success midline with 22G quincke, + csf pre and post aspiration, no paresthesias, no complications  Medications:    Medications: bupivacaine (pf) (MARCAINE) injection 0.75% - Intraspinal   1.8 mL - 6/10/2024 10:00:00 AM

## 2024-06-10 NOTE — OP NOTE
OPERATIVE REPORT      Patient: Radha Alexandre   : 1955    MRN: [id number]  Date: 06/10/2024      Surgeon: Ashkan Taylor MD  Assistant: Sheyla Terrazas NP, CN.  Certified first assist was necessary as a skilled set of hands and was necessary for proper patient positioning as well as assistance with closure in place with multiple implants.  Preoperative Diagnosis:  Left failed total hip arthroplasty  Postoperative Diagnosis: Same  Procedure:  Revision left total hip arthroplasty acetabular component only  Anesthesiologist: No responsible provider has been recorded for the case.  OR Staff: Circulator: Nirmal Wan RN  Nurse Practitioner: Sheyla Terrazas FNP  Scrub Person: Darren Balbuena ST; Jimi Arambula ST  Implants:   Implant Name Type Inv. Item Serial No.  Lot No. LRB No. Used Action   SHELL TRIDENT II MULTI 56MM - [id number]  SHELL TRIDENT II MULTI 56MM  [employer]. [id number] Left 1 Implanted   SCREW TRIDENT II LP HEX 6.5X35 - [id number]  SCREW TRIDENT II LP HEX 6.5X35  [employer]AH [employer]. [id number] Left 1 Implanted   CANCELLOUS CHIPS   2028   Left 1 Implanted   CANCELLOUS CHIPS      Left 1 Implanted   SCREW TRIDENT II LP HEX 6.5X40 - [id number]  SCREW TRIDENT II LP HEX 6.5X40  [employer]ARAH [employer]. [id number] Left 1 Implanted   INSERT TRIDENT X3 10D 36MM F - [id number]  INSERT TRIDENT X3 10D 36MM F  [employer]ARAH [employer]. [id number] Left 1 Implanted   SCREW TRIDENT II LP HEX 6.5X20 - [id number]  SCREW TRIDENT II LP HEX 6.5X20  SARHA [employer]. [id number] Left 1 Implanted   SCREW TRIDENT II LP HEX 6.5X30 - [id number]  SCREW TRIDENT II LP HEX 6.5X30  [employer]ARAH [employer]. [id number] Left 1 Implanted   FEMORAL HEAD    SARAH [id number] Left 1 Implanted   LEFT HIP EXPLANTS      Left 4 Explanted     EBL: See anesthesia note  Complications: None  Disposition: To PACU,  stable    Indications: Radha Alexandre is a 69 y.o. female presenting with the aforementioned injuries/findings. The procedure is indicated to stabilize left hip.  Patient has history of total hip arthroplasty.  Performed years ago.  She would well initially however developed worsening pain to the left hip.  X-rays as well as CT scan reviewed.  Showed loose acetabular component with a broken screw.  Discussed all treatment options with the patient.  Risks, benefits alternatives to revision of the left hip were discussed in detail.  All questions answered to patient's satisfaction.  No guarantees made.  Despite these risks elected proceed with surgical intervention..  The patient is awake and alert. After thorough discussion of the risks, benefits, expected outcomes, and alternatives to surgical intervention, the patient agreed to proceed with surgical treatment.  Specific risks discussed included, but were not limited to: superficial or deep infection, wound healing complications, DVT/PE, significant bleeding requiring transfusion, damage to named anatomic structures in the immediate area including named neurovascular structures, infection, nonunion, malunion and general risks of anesthesia.  The patient voiced understanding and written as well as verbal consent was obtained by myself prior to the procedure.    Procedure Note:  The patient was brought back to the OR and placed supine on the OR table. After successful induction of anesthesia by anesthesia staff, the patient was positioned in the lateral decubitus position and all bony prominences were padded appropriately.  The surgical field was then provisionally cleansed and then prepped and draped in the usual sterile fashion.    At this time a time-out was performed, with the correct patient, site, and procedure identified.  The universal time out as well as sign your site protocols were followed.  Preoperative antibiotics were verified as administered.      Approach to hip was performed centered on the posterior 3rd of the greater trochanter.  Taken through skin subcutaneous tissue.  Incision was carried through to her we will use fascia.  Gluteus fascia was split in line with the fibers.  Charnley retractor was placed.  Hip was internally rotated.  Short external rotators were released off of the.  Hip was dislocated.  Evaluation of the femoral component showed it was well fixed however was retroverted about 10°..  Turned attention to the acetabulum.  Made a pocket anterior superior.  Moved the femoral neck into this area placed retractors around the acetabulum.  Released the palm and are in the remaining labrum around the soft tissue of the acetabulum.  Acetabular component was grossly loose.  We removed the polyethylene liner with an osteotome.  We eventually were able to find and remove the screw that remained.  It was broken.  Once we removed this screw, the acetabulum was removed bluntly using a rongeur.  We turned our attention towards fixation.  At this point, we began reaming.  We reamed down to the medial wall.  Patient had perforation anterior inferior.  We then reamed up with a small reamers up to a size 56.  Fifty-six was reamed just around the introitus.  We got good scratch fit this point as well.  We backfilled with 30 cc of chips.  We reverse reamed with a 55 Reamer recreate ring a hemispherical acetabular vault.  Following this we impacted a 56 F multi hole tritanium cup.  Had good initial Press-Fit.  We then placed 4 screws anterior superior x2 posterior superior x1 posterior x1.  We placed a 36 F 10 degree lipped liner with the lip posterior superior.  We then turned our attention to trialing.  Eventually trialed a 36+ 10 femoral head.  Hip was stable throughout range of motion.  Had good recreation of leg lengths.  Trunnion was cleaned.  It was impacted in position found to be down.    The incision(s) was/were then irrigated using copious  sterile saline as well as Betadine lavage. The surgical wound was closed in layered fashion.  The surgical site(s) was/were were sterilely cleansed and dressed.    The patient was then subsequently transferred to to PACU in a stable condition.    All sponge and needle counts were correct at the end of the case.  I was present and participated in all aspects of the procedure.    Prognosis:  The patient will be kept WBAT on the ipsilateral extremity  .  Patient will receive DVT prophylaxis .       This note/OR report was created with the assistance of  voice recognition software or phone  dictation.  There may be transcription errors as a result of using this technology however minimal. Effort has been made to assure accuracy of transcription but any obvious errors or omissions should be clarified with the author of the document.

## 2024-06-11 VITALS
WEIGHT: 204.13 LBS | HEART RATE: 58 BPM | OXYGEN SATURATION: 99 % | RESPIRATION RATE: 18 BRPM | BODY MASS INDEX: 34.01 KG/M2 | DIASTOLIC BLOOD PRESSURE: 69 MMHG | SYSTOLIC BLOOD PRESSURE: 112 MMHG | TEMPERATURE: 98 F | HEIGHT: 65 IN

## 2024-06-11 LAB
ANION GAP SERPL CALC-SCNC: 7 MEQ/L
BUN SERPL-MCNC: 9.7 MG/DL (ref 9.8–20.1)
CALCIUM SERPL-MCNC: 8.6 MG/DL (ref 8.4–10.2)
CHLORIDE SERPL-SCNC: 108 MMOL/L (ref 98–107)
CO2 SERPL-SCNC: 25 MMOL/L (ref 23–31)
CREAT SERPL-MCNC: 0.65 MG/DL (ref 0.55–1.02)
CREAT/UREA NIT SERPL: 15
ERYTHROCYTE [DISTWIDTH] IN BLOOD BY AUTOMATED COUNT: 12.9 % (ref 11.5–17)
GFR SERPLBLD CREATININE-BSD FMLA CKD-EPI: >60 ML/MIN/1.73/M2
GLUCOSE SERPL-MCNC: 118 MG/DL (ref 82–115)
HCT VFR BLD AUTO: 37.3 % (ref 37–47)
HGB BLD-MCNC: 12.1 G/DL (ref 12–16)
MCH RBC QN AUTO: 31.2 PG (ref 27–31)
MCHC RBC AUTO-ENTMCNC: 32.4 G/DL (ref 33–36)
MCV RBC AUTO: 96.1 FL (ref 80–94)
NRBC BLD AUTO-RTO: 0 %
PLATELET # BLD AUTO: 361 X10(3)/MCL (ref 130–400)
PMV BLD AUTO: 10.2 FL (ref 7.4–10.4)
POTASSIUM SERPL-SCNC: 4.8 MMOL/L (ref 3.5–5.1)
RBC # BLD AUTO: 3.88 X10(6)/MCL (ref 4.2–5.4)
SODIUM SERPL-SCNC: 140 MMOL/L (ref 136–145)
WBC # SPEC AUTO: 12.68 X10(3)/MCL (ref 4.5–11.5)

## 2024-06-11 PROCEDURE — 97535 SELF CARE MNGMENT TRAINING: CPT

## 2024-06-11 PROCEDURE — 97116 GAIT TRAINING THERAPY: CPT

## 2024-06-11 PROCEDURE — 97530 THERAPEUTIC ACTIVITIES: CPT

## 2024-06-11 PROCEDURE — 85027 COMPLETE CBC AUTOMATED: CPT | Performed by: ORTHOPAEDIC SURGERY

## 2024-06-11 PROCEDURE — 80048 BASIC METABOLIC PNL TOTAL CA: CPT | Performed by: ORTHOPAEDIC SURGERY

## 2024-06-11 PROCEDURE — 36415 COLL VENOUS BLD VENIPUNCTURE: CPT | Performed by: ORTHOPAEDIC SURGERY

## 2024-06-11 PROCEDURE — 63600175 PHARM REV CODE 636 W HCPCS: Performed by: ORTHOPAEDIC SURGERY

## 2024-06-11 PROCEDURE — 25000003 PHARM REV CODE 250: Performed by: ORTHOPAEDIC SURGERY

## 2024-06-11 PROCEDURE — 97166 OT EVAL MOD COMPLEX 45 MIN: CPT

## 2024-06-11 RX ORDER — AMOXICILLIN 250 MG
2 CAPSULE ORAL 2 TIMES DAILY
Qty: 56 TABLET | Refills: 0 | Status: SHIPPED | OUTPATIENT
Start: 2024-06-11 | End: 2024-06-25

## 2024-06-11 RX ORDER — TRAMADOL HYDROCHLORIDE 50 MG/1
50 TABLET ORAL EVERY 4 HOURS PRN
Qty: 42 TABLET | Refills: 0 | Status: SHIPPED | OUTPATIENT
Start: 2024-06-11 | End: 2024-06-18

## 2024-06-11 RX ORDER — METHOCARBAMOL 750 MG/1
750 TABLET, FILM COATED ORAL EVERY 6 HOURS PRN
Qty: 56 TABLET | Refills: 0 | Status: SHIPPED | OUTPATIENT
Start: 2024-06-11 | End: 2024-06-25

## 2024-06-11 RX ORDER — KETOROLAC TROMETHAMINE 10 MG/1
10 TABLET, FILM COATED ORAL EVERY 8 HOURS
Qty: 15 TABLET | Refills: 0 | Status: SHIPPED | OUTPATIENT
Start: 2024-06-11 | End: 2024-06-16

## 2024-06-11 RX ORDER — ACETAMINOPHEN 500 MG
500 TABLET ORAL EVERY 4 HOURS
Qty: 84 TABLET | Refills: 0 | Status: SHIPPED | OUTPATIENT
Start: 2024-06-11 | End: 2024-06-25

## 2024-06-11 RX ORDER — NAPROXEN SODIUM 220 MG/1
81 TABLET, FILM COATED ORAL 2 TIMES DAILY
Qty: 84 TABLET | Refills: 0 | Status: SHIPPED | OUTPATIENT
Start: 2024-06-11 | End: 2024-07-23

## 2024-06-11 RX ADMIN — CEFAZOLIN 2 G: 2 INJECTION, POWDER, FOR SOLUTION INTRAMUSCULAR; INTRAVENOUS at 03:06

## 2024-06-11 RX ADMIN — METOCLOPRAMIDE 10 MG: 10 TABLET ORAL at 12:06

## 2024-06-11 RX ADMIN — TRAMADOL HYDROCHLORIDE 50 MG: 50 TABLET, COATED ORAL at 12:06

## 2024-06-11 RX ADMIN — ACETAMINOPHEN 500 MG: 500 TABLET ORAL at 05:06

## 2024-06-11 RX ADMIN — FAMOTIDINE 20 MG: 20 TABLET, FILM COATED ORAL at 08:06

## 2024-06-11 RX ADMIN — ASPIRIN 81 MG 81 MG: 81 TABLET ORAL at 08:06

## 2024-06-11 RX ADMIN — ACETAMINOPHEN 500 MG: 500 TABLET ORAL at 12:06

## 2024-06-11 RX ADMIN — TRAMADOL HYDROCHLORIDE 50 MG: 50 TABLET, COATED ORAL at 11:06

## 2024-06-11 RX ADMIN — METOCLOPRAMIDE 10 MG: 10 TABLET ORAL at 08:06

## 2024-06-11 RX ADMIN — METHOCARBAMOL 750 MG: 750 TABLET ORAL at 10:06

## 2024-06-11 RX ADMIN — ACETAMINOPHEN 500 MG: 500 TABLET ORAL at 02:06

## 2024-06-11 RX ADMIN — SENNOSIDES AND DOCUSATE SODIUM 2 TABLET: 8.6; 5 TABLET ORAL at 08:06

## 2024-06-11 RX ADMIN — ACETAMINOPHEN 500 MG: 500 TABLET ORAL at 10:06

## 2024-06-11 RX ADMIN — KETOROLAC TROMETHAMINE 15 MG: 30 INJECTION, SOLUTION INTRAMUSCULAR at 05:06

## 2024-06-11 RX ADMIN — DOCUSATE SODIUM 200 MG: 100 CAPSULE, LIQUID FILLED ORAL at 05:06

## 2024-06-11 RX ADMIN — TRAMADOL HYDROCHLORIDE 50 MG: 50 TABLET, COATED ORAL at 05:06

## 2024-06-11 NOTE — PROGRESS NOTES
"No acute events overnight.  Pain controlled.  Resting in bed. Feeling much better this AM.    Vital Signs  Temp: 98.1 °F (36.7 °C)  Temp Source: Oral  Pulse: 70  Resp: 18  SpO2: 99 %  Pulse Oximetry Type: Intermittent  Flow (L/min) (Oxygen Therapy): 10  Device (Oxygen Therapy): Face tent  BP: 113/70  Height and Weight  Height: 5' 5" (165.1 cm)  Weight: 92.6 kg (204 lb 2.3 oz)  Weight Method: Standard Scale  BSA (Calculated - sq m): 2.06 sq meters  BMI (Calculated): 34  Weight in (lb) to have BMI = 25: 149.9]    +FHL/EHL  BCR distally  Dressing c/d/i  SILT distally    Recent Lab Results         06/11/24  0506   06/10/24  1205   06/10/24  1032        Aerobic Culture - Tissue     No Growth At 24 Hours  [P]            No Growth At 24 Hours  [P]       Anion Gap 7.0           BUN 9.7           BUN/CREAT RATIO 15           Calcium 8.6           Chloride 108           CO2 25           Creatinine 0.65           eGFR >60           Glucose 118           GRAM STAIN     No WBCs, No bacteria seen            No WBCs, No bacteria seen       Hematocrit 37.3   41.0         Hemoglobin 12.1   13.3         MCH 31.2           MCHC 32.4           MCV 96.1           MPV 10.2           nRBC 0.0           Platelet Count 361           Potassium 4.8           RBC 3.88           RDW 12.9           Sodium 140           WBC 12.68                    [P] - Preliminary Result               A/P:  Status post revision KARON  Pain controlled  Overall patient doing well.  Therapy for mobility and ambulation.  ASA for DVT PPx  Doing [facility]  Home later today likely  "

## 2024-06-11 NOTE — PLAN OF CARE
Problem: Occupational Therapy  Goal: Occupational Therapy Goal  Description: Pt to perform car transfer with RW and SBA by d/c. MET  Outcome: Met

## 2024-06-11 NOTE — PT/OT/SLP PROGRESS
Physical Therapy Treatment    Patient Name:  Radha Alexandre   MRN:  [id number]    Recommendations:     Discharge Recommendations: Low Intensity Therapy  Discharge Equipment Recommendations: walker, rolling  Barriers to discharge: None    Assessment:     Radha Alexandre is a 69 y.o. female admitted with a medical diagnosis of Failed total hip arthroplasty, initial encounter.  She presents with the following impairments/functional limitations: weakness, impaired endurance, impaired functional mobility, decreased lower extremity function, pain, decreased ROM, edema, orthopedic precautions .    Rehab Prognosis: Good; patient would benefit from acute skilled PT services to address these deficits and reach maximum level of function.    Recent Surgery: Procedure(s) (LRB):  REVISION, TOTAL ARTHROPLASTY, HIP - ACETABULUM / PATHOLOGY/ SARAH STEM/ CELL SAVER (Left) 1 Day Post-Op    Plan:     During this hospitalization, patient to be seen BID to address the identified rehab impairments via gait training, therapeutic activities, therapeutic exercises and progress toward the following goals:    Plan of Care Expires:  06/14/24    Pt tolerated session well, educated on safety awareness, proper hydration, HEP, mobility, and pain management strategies to reduce risk of medical complications upon d/c home. All questions/concerns addressed. This note to serve as d/c summary.   Subjective     Chief Complaint: L hip pain  Patient/Family Comments/goals:   Pain/Comfort:  Location - Side 1: Left  Location 1: hip  Pain Addressed 1: Distraction, Reposition      Objective:     Communicated with nurse prior to session.  Patient found ambulatory in room/casey with OT upon PT entry to room.     General Precautions: Standard, fall  Orthopedic Precautions: LLE weight bearing as tolerated, LLE posterior precautions  Braces:    Respiratory Status: Room air     Functional Mobility:  Bed Mobility:     Supine to Sit: stand by assistance  Sit to Supine:  "stand by assistance  Transfers:     Sit to Stand:  stand by assistance with rolling walker  Gait: Pt ambulated 450 ft w rw and SBA, using step through gait pattern at slow pace  Stairs:  Pt ascended/descended 3 stair(s) and 4" curb step with Rolling Walker with right handrail with Contact Guard Assistance.           Treatment & Education:  Pt edu on total hip precautions, WB status and importance of frequent mobility  Pt completed KARON therex x10 AAROM    Patient left up in chair with all lines intact, call button in reach, nurse notified, and  present..    GOALS:   Multidisciplinary Problems       Physical Therapy Goals          Problem: Physical Therapy    Goal Priority Disciplines Outcome Goal Variances Interventions   Physical Therapy Goal     PT, PT/OT Progressing     Description: Pt will improve functional independence by performing:    Bed mobility: SBA MET  Sit to stand: SBA with rolling walker MET  Bed to chair t/f: SBA with Stand Step  with rolling walker MET  Ambulation x 200'  with SBA with rolling walker MET  1 Step (Curb): Min A  with rolling walker MET  6 Steps: Min A  with R HR  MET  Independent with total hip HEP MET                       Time Tracking:     PT Received On:    PT Start Time: 0943     PT Stop Time: 1006  PT Total Time (min): 23 min     Billable Minutes: Gait Training 15 and Therapeutic Activity 8    Treatment Type: Treatment  PT/PTA: PT     Number of PTA visits since last PT visit: 0     06/11/2024  "

## 2024-06-11 NOTE — PLAN OF CARE
Problem: Adult Inpatient Plan of Care  Goal: Plan of Care Review  Outcome: Progressing  Goal: Patient-Specific Goal (Individualized)  Outcome: Progressing  Goal: Absence of Hospital-Acquired Illness or Injury  Outcome: Progressing  Goal: Optimal Comfort and Wellbeing  Outcome: Progressing  Goal: Readiness for Transition of Care  Outcome: Progressing     Problem: Wound  Goal: Optimal Coping  Outcome: Progressing  Goal: Optimal Functional Ability  Outcome: Progressing  Goal: Absence of Infection Signs and Symptoms  Outcome: Progressing  Goal: Improved Oral Intake  Outcome: Progressing  Goal: Optimal Pain Control and Function  Outcome: Progressing  Goal: Skin Health and Integrity  Outcome: Progressing  Goal: Optimal Wound Healing  Outcome: Progressing     Problem: Infection  Goal: Absence of Infection Signs and Symptoms  Outcome: Progressing     Problem: Comorbidity Management  Goal: Blood Pressure in Desired Range  Reactivated     Problem: Hip Arthroplasty  Goal: Optimal Coping  Reactivated  Goal: Absence of Bleeding  Reactivated  Goal: Effective Bowel Elimination  Reactivated  Goal: Fluid and Electrolyte Balance  Reactivated  Goal: Optimal Functional Ability  Reactivated  Goal: Absence of Infection Signs and Symptoms  Reactivated  Goal: Intact Neurovascular Status  Reactivated  Goal: Anesthesia/Sedation Recovery  Reactivated  Goal: Acceptable Pain Control  Reactivated  Goal: Nausea and Vomiting Relief  Reactivated  Goal: Effective Urinary Elimination  Reactivated  Goal: Effective Oxygenation and Ventilation  Reactivated

## 2024-06-11 NOTE — PLAN OF CARE
Problem: Physical Therapy  Goal: Physical Therapy Goal  Description: Pt will improve functional independence by performing:    Bed mobility: SBA MET  Sit to stand: SBA with rolling walker MET  Bed to chair t/f: SBA with Stand Step  with rolling walker MET  Ambulation x 200'  with SBA with rolling walker MET  1 Step (Curb): Min A  with rolling walker MET  6 Steps: Min A  with R HR  MET  Independent with total hip HEP MET  Outcome: Progressing

## 2024-06-11 NOTE — PT/OT/SLP EVAL
"Occupational Therapy   Evaluation and Discharge Note    Name: Radha Alexandre  MRN: [id number]  Admitting Diagnosis: Failed total hip arthroplasty, initial encounter  Recent Surgery: Procedure(s) (LRB):  REVISION, TOTAL ARTHROPLASTY, HIP - ACETABULUM / PATHOLOGY/ SARAH STEM/ CELL SAVER (Left) 1 Day Post-Op    Recommendations:     Discharge Recommendations: Low Intensity Therapy  Discharge Equipment Recommendations: walker, rolling, bath bench  Barriers to discharge:  None    Assessment:     Radha Alexandre is a 69 y.o. female with a medical diagnosis of Failed total hip arthroplasty, initial encounter. At this time, patient is functioning at their prior level of function and does not require further acute OT services.     Plan:     During this hospitalization, patient does not require further acute OT services.  Please re-consult if situation changes.    Plan of Care Reviewed with: patient, spouse    Subjective     Chief Complaint: anxiety about posterior precautions  Patient/Family Comments/goals: "I slept on my back with the pillow last night because I was anxious about rolling to my side in my sleep."    Occupational Profile:  Living Environment: lives in 2SH with , 6 [facility] with bilateral hand rails. Tub/shower combo with grab bar and [facility].  Previous level of function: mod I, used standard walker for FM prior to surgery  Roles and Routines: retired teacher  Equipment Used at home: walker, standard  Assistance upon Discharge:     Pain/Comfort:  Pain Rating 1: 0/10  Location - Side 1: Left  Location - Orientation 1: generalized  Location 1: hip  Pain Addressed 1: Distraction, Reposition    Patients cultural, spiritual, Yarsani conflicts given the current situation: no    Objective:     Communicated with: NS prior to session.  Patient found HOB elevated with peripheral IV, hip abduction pillow upon OT entry to room.    General Precautions: Standard, fall  Orthopedic Precautions: LLE weight bearing as " tolerated, LLE posterior precautions  Braces: N/A  Respiratory Status: Room air     Occupational Performance:    Bed Mobility:    Patient completed Scooting/Bridging with supervision  Patient completed Supine to Sit with supervision    Functional Mobility/Transfers:  Patient completed Sit <> Stand Transfer with contact guard assistance  with  rolling walker   Patient completed Toilet Transfer Step Transfer technique with stand by assistance with  rolling walker and bedside commode  Patient completed Tub Transfer Step Transfer technique with stand by assistance with rolling walker  Patient completed car transfer step transfer technique with stand by assistance with rolling walker  Functional Mobility: Patient performed FM in hallway with RW and SBA, tolerated ~250 feet with no LOBs or rest breaks needed. Tolerated well.    Activities of Daily Living:  Grooming: supervision standing at sink with RW  Bathing: supervision sitting EOB for sponge bath  Upper Body Dressing: independence sittin gEOB  Lower Body Dressing: stand by assistance with use of reacher and sock aid to doff and don socks. OT demonstration proper use of both devices, pt able to return demo safely.  Toileting: supervision void, +urine    Cognitive/Visual Perceptual:  Cognitive/Psychosocial Skills:     -       Oriented to: Person, Place, Time, and Situation   -       Follows Commands/attention:Follows multistep  commands  -       Communication: clear/fluent  -       Memory: No Deficits noted  -       Safety awareness/insight to disability: intact   -       Mood/Affect/Coping skills/emotional control: Appropriate to situation, Cooperative, and Pleasant  Visual/Perceptual:      -Intact wears glasses    Physical Exam:  Motor Planning: -       intact  Upper Extremity Range of Motion:     -       Right Upper Extremity: WNL  -       Left Upper Extremity: WNL  Upper Extremity Strength:    -       Right Upper Extremity: WNL  -       Left Upper Extremity:  "WNL    Treatment & Education:  Reviewed roles and goals of occupational therapy with patient, POC for acute stay. Reviewed posterior hip precautions, she was able to independently recall 2/3. Cues for "no twisting." However, maintained them throughout session.   Issued long-handled sponge and personal gait belt to patient.     Patient left up in chair with all lines intact and Georgina, PT present    GOALS:   Multidisciplinary Problems       Occupational Therapy Goals       Not on file              Multidisciplinary Problems (Resolved)          Problem: Occupational Therapy    Goal Priority Disciplines Outcome Interventions   Occupational Therapy Goal   (Resolved)     OT, PT/OT Met    Description: Pt to perform car transfer with RW and SBA by d/c. MET                       History:     Past Medical History:   Diagnosis Date    Arthritis     Avulsion fracture of metatarsal bone of left foot     Bursitis of left hip     Heart valve regurgitation     High cholesterol     Hypertension     Thyroid disease          Past Surgical History:   Procedure Laterality Date    ARTHROCENTESIS OF HIP JOINT Left 04/26/2024    Procedure: ARTHROCENTESIS, HIP;  Surgeon: Ashkan Taylor MD;  Location: [facility];  Service: Orthopedics;  Laterality: Left;  left hip aspiration    COLONOSCOPY      CYSTOSCOPY      DILATION AND CURETTAGE OF UTERUS      JOINT REPLACEMENT  Dec 2017    Total left hip replacement    LAPAROSCOPY      RADIONUCLIDE BONE SCAN      REVISION TOTAL HIP ARTHROPLASTY Left 6/10/2024    Procedure: REVISION, TOTAL ARTHROPLASTY, HIP - ACETABULUM / PATHOLOGY/ SARAH STEM/ CELL SAVER;  Surgeon: Ashkan Taylor MD;  Location: [facility] OR;  Service: Orthopedics;  Laterality: Left;  revision L KARON, SPINAL       Time Tracking:     OT Date of Treatment: 06/11/24  OT Start Time: 0901  OT Stop Time: 0943  OT Total Time (min): 42 min    Billable Minutes:Evaluation 30  Self Care/Home Management 12    6/11/2024  "

## 2024-06-11 NOTE — PLAN OF CARE
F/u with pt-- std she spk w [place] ( [facility]) medical-- RW ready; family will pickup.

## 2024-06-11 NOTE — NURSING
Discharge instructions provided to patient with  at bedside. Patient verbalized understanding and had no follow up questions.       Nurse Note:       6/11/2024   11:35 AM      Perception of Care - Joint Replacement Population Total Joint Surgery List: HIP    Patient able to verbalize one way to treat/prevent SWELLING at home   [x] Yes   [] No   [] Further Education Provided        Attending Nurse:  Emily

## 2024-06-11 NOTE — DISCHARGE INSTRUCTIONS
[facility]  [address] [place] [address] [address]  [place] [address]  Phone [phone]       /      Fax [phone]  SURGEON: Dr. Taylor    After discharge, all questions or concerns should be handled at your surgeon's office ([phone]). If it is a weekend or after hours, you will get the surgeon on call.     Discharge Medications:    PAIN MANAGEMENT: Next Dose Available   Toradol/Ketorolac 10mg (Anti-inflammatory) - take every 8 hours, around the clock, for the next 5 days 2 PM on 6/11/24   Tylenol/Acetaminophen 500mg- every 4 hours, around the clock (WHILE AWAKE) 2 PM on 6/11/24   Ultram/Tramadol 50mg (Pain Med) - every 4-6 hours AS NEEDED for pain 3:30 PM if needed on 6/11/24   Robaxin/Methocarbamol 750mg (Muscle Relaxer) - Every 6-8 hours AS NEEDED for muscle spasms, thigh pain or additional pain control 5:30 PM of needed on 6/11/24   Neurontin/Gabapentin 300mg (Nerve/Shocking Pain) - ok to restart home regimen Home regimen   COMPLICATION PREVENTION MEDS: Next Dose DUE   Aspirin 81mg twice a day for 6 weeks post-op for blood clot prevention PM on 6/11/24   Senokot S/Isi-Colace 8.6/50mg - 2 tablets once or twice a day while on narcotics and muscle relaxers for constipation prevention PM on 6/11/24   NOZIN NASAL  - twice a day for 2 weeks or until supply runs out, whichever comes first (Infection prevention) PM on 6/11/24   Take a medication once or twice a day while taking TORADOL and Aspirin at the same time to prevent heartburn PM on 6/11/24       Total Hip Replacement                                                                                                                                  PAIN MEDICATIONS/PAIN MANAGEMENT: (Use the medication log in your discharge packet to keep track of your medications)  Toradol/Ketorolac 10mg (anti-inflammatory) - take every 8 hours around the clock for the next 5 days.   While on Toradol/Ketorolac and Aspirin (blood thinner) at the  same time, take a medication once or twice a day to protect your stomach (for the next 5 days) (Examples: Nexium, Prilosec, Prevacid, Omeprazole, Pepcid...etc). If you start having intolerable stomach issues, discontinue the Toradol completely.   Aside from Toradol, No other anti-inflammatories (Ibuprofen, Aleve, Motrin, Naproxen, Mobic/Meloxicam, Celebrex, Diclofenac/Voltaren...etc) for 2 weeks or until the wound is healed.      Tylenol/Acetaminophen 500mg every 4 hours, around the clock (WHILE AWAKE).   Take Ultram (Tramadol) 50mg (pain pill) every 4-6 hours as needed for pain.    **NO MORE THAN 3000mg OF TYLENOL IN 24 HOURS**.     Robaxin/Methocarbamol 750mg (muscle relaxer)- you can take every 6-8 hours as needed for muscle spasms, thigh pain and stiffness, additional pain control or breakthrough pain medications. This medication is helpful for pain control while lessening your need for narcotics. Please reduce the use gradually as the pain and spasms lessen. DO NOT TAKE AT THE SAME TIME AS A PAIN PILL. YOU WILL BE BETTER SERVED WITH 2 HOURS BETWEEN PAIN PILL AND MUSCLE RELAXER.     Neurontin/Gabapentin 100mg (neuropathic/shocking/ nerve like pain) - ok to restart home regimen.  If you get to sleepy during the day, SWITCH TO BEDTIME DOSING or decrease dose and frequency while on pain pills and muscle relaxers.     **Other things that help with pain control is WALKING, COMPRESSION WRAP, ICE and ELEVATION!!**    BLOOD CLOT PREVENTION:   Aspirin 81mg (blood thinner) - twice a day for 6 weeks for blood clot prevention. Start on the evening of 6/11/24. Stop on 7/23/24.  If you were taking Baby Aspirin 81mg prior to surgery, may return to daily dose AFTER 6 weeks - 7/24/24.  You need to continuing wearing your compression stocking (MADISON Hose - ThromboEmbolic Disease Prevention Device) for the next 2-6 weeks post-op. It is ok to remove them for hygiene and at bedtime.   Hand wash and Dry. **If the swelling persists in  the legs after you stop wearing the Luciano hose, continue to wear them until the swelling decreases.**  REMOVE STOCKINGS AT LEAST DAILY FOR SKIN ASSESSMENT.   Do NOT let the stockings roll down, creating a tourniquet around the back of your knee. If you need to, leave the excess at the bottom of the stocking.   The best thing you can do to prevent blood clots is to walk around as much as possible, AT LEAST EVERY 1-2 HOURS.       CONSTIPATION PREVENTION:   Miralax or Senokot S/Isi-Colace and Stool softeners EVERY DAY while on pain meds.  Use other more aggressive over the counter LAXATIVES as needed for constipation (Examples: Milk of Magnesia, Dulcolax tabs or suppository, Magnesium Citrate, Fleet's Enema...etc.)   Drink lots of water.  Increase Fiber in diet.  Increase walking distance each day  DO NOT GO MORE THAN 2 DAYS WITHOUT HAVING A BOWEL MOVEMENT!    ACTIVITY:   Weight bearing precautions as follows:  FULL weight bearing to operative leg with walker.   HIP PRECAUTIONS:  NO Twisting  NO Leaning past 90 degrees  NO Crossing legs    DO NOT TAKE YOURSELF OFF OF THE WALKER TOO SOON. ALLOW YOUR OUTPATIENT THERAPIST or SURGEON TO GUIDE YOU.   Change positions often throughout the day.   Walk around at least every 1-2 hours while awake.   No heavy lifting, pulling, pushing or straining.  Ice the Hip and thigh AS MUCH AS POSSIBLE  Outpatient Physical Therapy - Bring prescription to clinic of choice as soon as possible.        WOUND CARE:   Operative Hip poke hole - Occlusive Coverlet dressing (Small White bandage)  - change every other day and as needed for soiling. Start THURSDAY.  NO ointments, creams, lotions or antiseptics on the incision.     Dry dressing changes every other day and as needed for soiling for 14 days. Start THURSDAY. Switch to every other day as soon as possible. NOTIFY MD OF EXCESSIVE WOUND DRAINAGE.    DO NOT WET WOUND or apply any ointments, creams, lotions or antiseptics.  Ace wrap - apply your  compression stocking and apply the ace wrap where the stocking stops for extra added compression to the knee.   May wet incision AFTER 2 weeks- (after you follow-up with your surgeon).   Ok to shower before then if able to keep wound from getting wet (plastic barrier, saran wrap or cling wrap and tape).   DO NOT TOUCH INCISION      URINARY RETENTION:  If you start having difficulty urinating, decrease the use of Pain pills and muscle relaxers and notify your primary care doctor.     PNEUMONIA PREVENTION:  Stay out of bed as much as possible and walk around every 1-2 hours.  Continue breathing exercises (Incentive Spirometry) every 1-2 hours while mobility is limited and while you are on pain pills.    FALL PREVENTION:  Wear sturdy shoes that fit well - Wearing shoes with high heels or slippery soles, or shoes that are too loose, can lead to falls. Walking around in bare feet, or only socks, can also increase your risk of falling.  Use walker as long as your surgeon and therapist recommend it  Use good lighting and  throw rugs, electrical cords, furniture and clutter (anything than can cause you to trip at home.   Non-slip rug in bathroom or shower    INFECTION PREVENTION:  NOZIN ANTISEPTIC NASAL  - twice a day for 2 weeks or until supply runs out, whichever comes first. Shake bottle well, saturate cotton swab with 4 drops of antiseptic solution. Swab right nostril rim six times clockwise and counterclockwise. Take swab out, apply 2 more drops then swab left nostril rim six times clockwise and counterclockwise.   Proper handwashing before and after dressing changes. Do not wet the wound. Wound care instructions as written above. NOTIFY MD OF EXCESSIVE WOUND DRAINAGE.  No alcohol, smoking or tobacco products  Pets should not be allowed around the wound or the dressing.   Treat UTI and skin infections as soon as possible.  Pre-medicate with antibiotics prior to dental or surgical procedures.   If you  are diabetic, MAINTAIN GOOD BLOOD SUGAR CONTROL (Below 150) DURING YOUR RECOVERY. If you see high numbers, notify your primary care doctor.     Call your SURGEON'S OFFICE ([phone]) if you experience the following signs and symptoms of infection:   Unusual redness, swelling, excessive, cloudy or foul smelling drainage at the incision site.   Persistent low grade temp OR a temp greater than 102 F, unrelieved by Tylenol  Pain at surgical site, unrelieved by pain meds    Warning signs of a blood clot in your leg: (CALL YOUR SURGEON)  New onset or increasing pain in calf, new onset tenderness or redness above or below the knee or increasing swelling of your calf, ankle, or foot.  Warning signs that a blood clot has traveled to your lungs: (REPORT TO THE ER/CALL 905)  Sudden or increase in Shortness of breath, sudden onset of chest pains, or  Localized chest pain with coughing.       IF ANY ISSUES ARISE AND YOU FEEL THE NEED TO CALL YOUR PRIMARY CARE DOCTOR, PLEASE LET YOUR SURGEON KNOW AS WELL.     For emergencies, please report to OUR ([facility] or [facility] main campus) Emergency department and tell them to call YOUR SURGEON at [phone].     BEFORE MAKING ANY CHANGES TO THE MEDICAL CARE PLAN OR GOING TO THE EMERGENCY ROOM, PLEASE CONTACT THE SURGEON.    3rd floor nursing unit # [phone]  Use this number for questions about your discharge instructions or problems filling your discharge prescriptions.

## 2024-06-12 ENCOUNTER — PATIENT OUTREACH (OUTPATIENT)
Dept: ADMINISTRATIVE | Facility: CLINIC | Age: 69
End: 2024-06-12
Payer: MEDICARE

## 2024-06-12 LAB — VIEW PATHOLOGY REPORT (RELIAPATH): NORMAL

## 2024-06-12 NOTE — PROGRESS NOTES
C3 nurse spoke with Radha Alexandre  for a TCC post hospital discharge follow up call. The patient has a scheduled HOSFU appointment with  Ashkan Taylor MD (Orthopedic Surgery) on Tuesday 6/25/24 @ 8:45am w/ Sheyla (Dr Taylor's NP)       [facility]  Hospitalist Discharge Summary      Date of Admission:  8/12/2022  Date of Discharge:  8/17/2022  Discharging Provider: Bryan Mason MD  Discharge Service: Hospitalist Service    Discharge Diagnoses   Principal Problem:    Syncope  Active Problems:    Acute on chronic respiratory failure with hypoxia (H)    Closed fracture of multiple ribs    Pneumonia of left lower lobe due to infectious organism    Stage 3 severe COPD by GOLD classification (H)    Bradycardia        Follow-ups Needed After Discharge   Follow-up Appointments     Follow-up and recommended labs and tests       Follow up with primary care provider, Prince Proctor, within 7 days for   hospital follow- up.  No follow up labs or test are needed.             Unresulted Labs Ordered in the Past 30 Days of this Admission     Date and Time Order Name Status Description    8/12/2022  2:32 PM Blood Culture Arm, Right Preliminary     8/12/2022  2:32 PM Blood Culture Line, venous Preliminary       These results will be followed up by Dr. Proctor    Discharge Disposition   Discharged to home  Condition at discharge: Satisfactory      Hospital Course   Syncope  Episode at home where he was unconscious for 10 to 15 seconds causing a fall and subsequent rib fracture  Etiology of syncope not clear, noted to be bradycardic in the ED  Not clear if pneumonia had been present prior leading to weakness and syncope/fall or if syncope was primary event  Has been on telemetry with some episodes of dropped beats, otherwise unremarkable  Echo today appear normal with normal EF and function  At discharge will order cardiac monitoring    Acute on chronic respiratory failure with hypoxia (H)  Uses oxygen at home at 2 L intermittently, currently at 5 L  May be due to underlying COPD exacerbation as well as rib fracture with reduced rib movement  5/16/2022-still on 4 liters NC, less short of air, moving in room more    Closed fracture of  multiple ribs  Rib fractures 6 through 9 on the left side result of fall  Pain control, encourage deep cough and breathing  8/17/2022-Home with short course of oral dilaudid, encourage good pulmonary toilet    Pneumonia of left lower lobe due to infectious organism  Noted on imaging, currently on Rocephin and azithromycin  8/16/2022-clinically improved, will change over to oral meds  8/17/2022-at discharge home on oral ceftin      Bradycardia  Noted, having some episodes of dropped beats  Continue current monitoring  Magnesium low yesterday has been replaced    Stage 3 severe COPD by GOLD classification (H)  Increased respiratory distress at admission consider possible exacerbation of COPD  Currently on steroids with frequent scheduled neb treatments  8/17/2022-home on short course of steroids with taper        Consultations This Hospital Stay   SURGERY GENERAL IP CONSULT  PHYSICAL THERAPY ADULT IP CONSULT  OCCUPATIONAL THERAPY ADULT IP CONSULT    Code Status   Full Code    Time Spent on this Encounter   I, Bryan Mason MD, personally saw the patient today and spent greater than 30 minutes discharging this patient.       Bryan Mason MD  [facility]  [address]  [place] [address]  Phone: [phone]  Fax: [phone]  ______________________________________________________________________    Physical Exam   Vital Signs: Temp: 96.8  F (36  C) Temp src: Tympanic BP: (!) 167/86 Pulse: 51   Resp: 16 SpO2: 94 % O2 Device: Nasal cannula Oxygen Delivery: 4 LPM  Weight: 175 lbs 3.2 oz  Constitutional: awake, alert, cooperative, no apparent distress, and appears stated age  Respiratory: lungs are clear  Cardiovascular: regular rate and rhythm  GI: normal bowel sounds, soft and non-distended  Musculoskeletal: no lower extremity pitting edema present  there is no redness, warmth, or swelling of the joints       Primary Care Physician   Prince Proctor    Discharge Orders       Reason for your hospital stay    Admitted after a fall with fractured ribs and shortness of breath due to pneumonia     Activity    Your activity upon discharge: activity as tolerated     Follow-up and recommended labs and tests     Follow up with primary care provider, Prince Proctor, within 7 days for hospital follow- up.  No follow up labs or test are needed.     Adult Leadless EKG Monitor 8 to 14 Days     Oxygen Adult/Peds    Oxygen Documentation:   I certify that this patient, Brent Villagomez has been under my care (or a nurse practitioner or physican's assistant working with me). This is the face-to-face encounter for oxygen medical necessity.      Brent Villagomez is now in a chronic stable state and continues to require supplemental oxygen. Patient has continued oxygen desaturation due to Chronic Respiratory Failure with Hypoxia J96.11.    Alternative treatment(s) tried or considered and deemed clinically infective for treatment of Chronic Respiratory Failure with Hypoxia J96.11 include nebulizers, inhalers, and steroids.  If portability is ordered, is the patient mobile within the home? yes    **Patients who qualify for home O2 coverage under the CMS guidelines require ABG tests or O2 sat readings obtained closest to, but no earlier than 2 days prior to the discharge, as evidence of the need for home oxygen therapy. Testing must be performed while patient is in the chronic stable state. See notes for O2 sats.**     Diet    Follow this diet upon discharge: Orders Placed This Encounter      Combination Diet Regular Diet Adult       Significant Results and Procedures   Most Recent 3 CBC's:Recent Labs   Lab Test 08/13/22  0551 08/12/22  1443 09/15/21  0455 09/14/21  0447   WBC 6.9 8.6  --  8.8   HGB 14.7 17.0  --  14.3   MCV 98 97  --  100    179 209 209     Most Recent 3 BMP's:Recent Labs   Lab Test 08/17/22  0752 08/17/22  0706 08/16/22  0702 08/15/22  2156 08/15/22  1134 08/13/22  1134 08/13/22  0551  08/12/22  2244 08/12/22  1443 09/15/21  0455   NA  --   --   --   --   --   --  140  --  137 139   POTASSIUM  --  4.2 4.3  --   --   --  4.4  --  4.7 3.7   CHLORIDE  --   --   --   --   --   --  106  --  100 107   CO2  --   --   --   --   --   --  27  --  28 23   BUN  --   --   --   --   --   --  22  --  27* 19   CR  --   --   --   --   --   --  0.89  --  1.15 0.79  0.80   ANIONGAP  --   --   --   --   --   --  7  --  9 9   MARCY  --   --   --   --   --   --  8.7  --  10.3 8.8   GLC 81  --   --  88 76   < > 81   < > 108* 87    < > = values in this interval not displayed.   ,   Results for orders placed or performed during the hospital encounter of 08/12/22   XR Chest Port 1 View    Narrative    PROCEDURE:  XR CHEST PORT 1 VIEW    HISTORY: trauma. .    COMPARISON:  9/11/2021    FINDINGS:    The cardiomediastinal contours are stable. There is calcific aortic  atherosclerosis.   Advanced emphysematous changes are again seen. Some superimposed  consolidation is questioned at the left lung base. No pneumothorax or  gross bony trauma is identified.      Impression    IMPRESSION:  Advanced emphysema. Question focal consolidation at the  left lung base. Recommend follow-up.      NAVDEEP OLSON MD         SYSTEM ID:  [id number]   CT Chest w/o Contrast    Narrative    PROCEDURE: CT CHEST W/O CONTRAST 8/12/2022 4:16 PM    HISTORY: recent trauma, rib concern    COMPARISONS: None.    Meds/Dose Given:    TECHNIQUE: CT scan of the chest without IV contrast sagittal coronal  reconstructions were obtained    FINDINGS: There are fractures of the sixth, seventh, eighth and ninth  ribs. No associated pneumothorax is seen. There is some minimal  pleural thickening or effusion seen in the left hemithorax. No  thoracic fracture is seen. Sternum is intact.    Severe emphysematous changes are noted in the lungs. There is some  linear scarring seen in both lungs. In the right lower lobe there is a  subpleural nodule measuring 9 mm. This  nodule is unchanged from a  previous CT of the chest at 2019.    The hilar and mediastinal lymph nodes are normal in caliber. Axillary  and supraclavicular lymph nodes are normal. The heart is normal in  size         Impression    IMPRESSION: Fractures of the left sixth, seventh, eighth and ninth  ribs. There is no left-sided pneumothorax. A small pleural effusion is  seen    Rib Trauma: 3+ acute rib fractures     PANCHO DUARTE MD         SYSTEM ID:  [id number]   US Carotid Bilateral    Narrative    PROCEDURE: US CAROTID BILATERAL 2022 5:59 PM    HISTORY: syncope    COMPARISONS: None.    TECHNIQUE: Duplex ultrasound of the carotids    FINDINGS: There is atherosclerotic plaquing of both carotid  bifurcations. No hemodynamically significant lesions are identified in  the distal common or proximal internal carotid arteries. There is a  hemodynamically significant stenosis noted in the right external  carotid artery.    Forward flow was demonstrated in both vertebral arteries.         Impression    IMPRESSION: No hemodynamically significant stenoses are identified in  the distal common or proximal internal carotid arteries    PANCHO DUARTE MD         SYSTEM ID:  [id number]   Echocardiogram Complete     Value    LVEF  55-60%    Narrative    [id number]  [id number]  [id number]  [id number]^FAITH^RK     [facility]  [address] [place] [address]     Name: MICHEL KEBEDE  MRN: [id number]  : 1948  Study Date: 08/15/2022 07:21 AM  Age: 74 yrs  Gender: Male  Patient Location: [facility]  Reason For Study: Syncope  Ordering Physician: FAITH LIMON  Performed By: Pippa Almeida RDCS, RVT     BSA: 1.9 m2  Height: 68 in  Weight: 178 lb  HR: 50  BP: 117/71 mmHg  ______________________________________________________________________________  Procedure  Complete Portable Echo Adult.  ______________________________________________________________________________  Interpretation  Summary  Technically difficult study with limited views obtained.     Left ventricular size, wall motion and function are normal. The ejection  fraction is 55-60%.  Right ventricular function, chamber size, wall motion, and thickness are  normal.  PASP 42 mmHg (27 + 15 mmHg)  IVC diameter >2.1 cm collapsing <50% with sniff suggests a high RA pressure  estimated at 15 mmHg or greater. No pericardial effusion is present.     There is no prior study for direct comparison.  ______________________________________________________________________________  Left Ventricle  Left ventricular size, wall motion and function are normal. The ejection  fraction is 55-60%. Left ventricular diastolic function is normal. No regional  wall motion abnormalities are seen.     Right Ventricle  Right ventricular function, chamber size, wall motion, and thickness are  normal.     Atria  The left atrium appears normal. Mild right atrial enlargement is present.     Mitral Valve  The mitral valve is normal.     Aortic Valve  Trileaflet aortic sclerosis without stenosis. No aortic regurgitation is  present.     Tricuspid Valve  The tricuspid valve is normal. Mild tricuspid insufficiency is present. The  right ventricular systolic pressure is approximated at 27.6 mmHg plus the  right atrial pressure. Mild (pulmonary artery systolic pressure<50mmHg)  pulmonary hypertension is present. PASP 42 mmHg (27 + 15 mmHg).     Pulmonic Valve  The pulmonic valve is normal.     Vessels  The aorta root is normal. The inferior vena cava was dilated at 3.06 cm  without respiratory variability, consistent with increased right atrial  pressure. IVC diameter >2.1 cm collapsing <50% with sniff suggests a high RA  pressure estimated at 15 mmHg or greater.     Pericardium  No pericardial effusion is present.     Compared to Previous Study  There is no prior study for direct comparison.     Attestation  I have personally viewed the imaging and agree with the  interpretation and  report as documented by the fellow, Osman Swain, and/or edited by me.  ______________________________________________________________________________  MMode/2D Measurements & Calculations     IVSd: 0.97 cm  LVIDd: 3.6 cm  LVIDs: 2.5 cm  LVPWd: 1.0 cm  FS: 30.8 %  LV mass(C)d: 106.4 grams  LV mass(C)dI: 54.7 grams/m2  Ao root diam: 3.5 cm  LVOT diam: 2.5 cm  LVOT area: 4.9 cm2  LA Volume (BP): 54.4 ml  LA Volume Index (BP): 27.9 ml/m2  RWT: 0.58     Doppler Measurements & Calculations  MV E max alcon: 102.0 cm/sec  MV A max alcon: 42.7 cm/sec  MV E/A: 2.4  MV dec slope: 403.0 cm/sec2  MV dec time: 0.25 sec  Ao V2 max: 112.0 cm/sec  Ao max P.0 mmHg  Ao V2 mean: 80.8 cm/sec  Ao mean PG: 3.0 mmHg  Ao V2 VTI: 25.3 cm  CHRISTINA(I,D): 4.1 cm2  CHRISTINA(V,D): 3.8 cm2  LV V1 max P.9 mmHg  LV V1 max: 85.8 cm/sec  LV V1 VTI: 20.9 cm  SV(LVOT): 102.6 ml  SI(LVOT): 52.7 ml/m2  TR max alcon: 262.0 cm/sec  TR max P.6 mmHg  AV Alcon Ratio (DI): 0.77  CHRISTINA Index (cm2/m2): 2.1  E/E' av.5  Lateral E/e': 7.6  Medial E/e': 9.4     ______________________________________________________________________________  Report approved by: Tessy BAUMAN 08/15/2022 08:45 AM               Discharge Medications   Current Discharge Medication List      START taking these medications    Details   cefuroxime (CEFTIN) 500 MG tablet Take 1 tablet (500 mg) by mouth every 12 hours for 5 days  Qty: 10 tablet, Refills: 0    Associated Diagnoses: Pneumonia of left lung due to infectious organism, unspecified part of lung; Community acquired pneumonia of left lung, unspecified part of lung      HYDROmorphone (DILAUDID) 2 MG tablet Take 1 tablet (2 mg) by mouth every 4 hours as needed for moderate to severe pain  Qty: 15 tablet, Refills: 0    Associated Diagnoses: Other long term (current) drug therapy      predniSONE (DELTASONE) 20 MG tablet 2 tabs twice daily for 2 days, then one tab twice daily for 2 days, then 1/2 tab daily for 2 days  then stop  Qty: 7 tablet, Refills: 0    Associated Diagnoses: Stage 3 severe COPD by GOLD classification (H); COPD exacerbation (H)         CONTINUE these medications which have NOT CHANGED    Details   albuterol (PROAIR HFA/PROVENTIL HFA/VENTOLIN HFA) 108 (90 Base) MCG/ACT inhaler Inhale 2 puffs into the lungs every 6 hours as needed for shortness of breath / dyspnea  Qty: 8.5 g, Refills: 11    Comments: Pharmacy may dispense brand covered by insurance (Proair, or proventil or ventolin or generic albuterol inhaler)  Associated Diagnoses: Stage 3 severe COPD by GOLD classification (H); Pulmonary emphysema, unspecified emphysema type (H)      albuterol (PROVENTIL) (2.5 MG/3ML) 0.083% neb solution TAKE 3 ML BY NEBULIZATION EVERY FOUR HOURS AS NEEDED FOR SHORTNESS OF BREATH.  Qty: 90 mL, Refills: 3    Associated Diagnoses: Stage 3 severe COPD by GOLD classification (H); Pulmonary emphysema, unspecified emphysema type (H)      amLODIPine (NORVASC) 5 MG tablet Take 2.5 mg by mouth daily       aspirin EC 81 MG EC tablet Take 81 mg by mouth daily       Fluticasone-Umeclidin-Vilanterol (TRELEGY ELLIPTA) 100-62.5-25 MCG/INH oral inhaler Inhale 1 puff into the lungs daily       lisinopril-hydrochlorothiazide (ZESTORETIC) 20-12.5 MG tablet Take 1 tablet by mouth daily       loratadine (CLARITIN) 10 MG tablet Take 10 mg by mouth 2 times daily       LORazepam (ATIVAN) 1 MG tablet Take 1 tablet (1 mg) by mouth 3 times daily as needed for anxiety  Qty: 90 tablet, Refills: 0    Associated Diagnoses: Anxiety      montelukast (SINGULAIR) 10 MG tablet Take 10 mg by mouth At Bedtime       multivitamin, therapeutic (THERA-VIT) TABS tablet Take 1 tablet by mouth daily      nicotine (COMMIT) 2 MG lozenge Place 2 mg inside cheek every hour as needed for smoking cessation      tamsulosin (FLOMAX) 0.4 MG capsule Take 0.8 mg by mouth daily       Respiratory Therapy Supplies (INNOSPIRE REPLACEMENT FILTER) MISC       Respiratory Therapy  Supplies (NEBULIZER/TUBING/MOUTHPIECE) KIT Use when taking nebs           Allergies   Allergies   Allergen Reactions     No Clinical Screening - See Comments      Dust and pollen

## 2024-06-13 LAB
BACTERIA SPEC ANAEROBE CULT: NORMAL
BACTERIA SPEC ANAEROBE CULT: NORMAL

## 2024-06-14 ENCOUNTER — PATIENT MESSAGE (OUTPATIENT)
Dept: ADMINISTRATIVE | Facility: OTHER | Age: 69
End: 2024-06-14
Payer: MEDICARE

## 2024-06-15 LAB
BACTERIA TISS AEROBE CULT: NO GROWTH
BACTERIA TISS AEROBE CULT: NO GROWTH

## 2024-06-19 NOTE — DISCHARGE SUMMARY
[facility] - Orthopaedics  Discharge Summary      Admit Date: 6/10/2024    Discharge Date and Time: 6/11/2024 11:43 AM    Attending Physician: Elke att. providers found     Reason for Admission: failed total hip arthroplasty; left    Procedures Performed: Procedure(s) (LRB):  REVISION, TOTAL ARTHROPLASTY, HIP - ACETABULUM / PATHOLOGY/ SARAH STEM/ CELL SAVER (Left)    Hospital Course Patient seen in clinic with complaints of left hip pain status post left total hip arthroplasty. Tried and failed all conservative measures including activity modification, anti-inflammatories, and physical therapy. Patient felt as though they have reached a point of disability with regards to left hip pain. Patient was worked up for infection and found to be negative. Risk, benefits, and alternatives discussed for revision of left total hip arthroplasty. Despite these risks, the patient wished to proceed with surgical intervention.    Patient admitted as an inpatient. Seen preoperatively by anesthesia and cleared for surgery. Patient was then taken to the OR and a revision of left total hip arthroplasty was performed. For full details please see dictated operative note. Patient tolerated the procedure without any issues and was transferred to the floor postoperatively. Physical therapy began working with the patient on postop day 1 and patient was made weightbearing as tolerated to affected extremity. Patient progressed well with physical therapy and eventually cleared all physical therapy guidelines. Patient's pain and vitals remained stable throughout hospitalization. Wound was checked and found to be clean, dry, and intact. At this point the patient was deemed suitable to discharge home.        Goals of Care Treatment Preferences:  Code Status: Full Code      Consults: PT    Significant Diagnostic Studies:   Specimen (24h ago, onward)      None            Final Diagnoses:    Principal Problem: Failed total hip  arthroplasty, initial encounter   Secondary Diagnoses:   Active Hospital Problems    Diagnosis  POA    *Failed total hip arthroplasty, initial encounter [T84.674A, Z96.867]  Not Applicable      Resolved Hospital Problems   No resolved problems to display.       Discharged Condition: good    Disposition: Home or Self Care    Follow Up/Patient Instructions:     Medications:  Reconciled Home Medications:      Medication List        START taking these medications      acetaminophen 500 MG tablet  Commonly known as: TYLENOL  Take 1 tablet (500 mg total) by mouth every 4 (four) hours. for 14 days     aspirin 81 MG Chew  Take 1 tablet (81 mg total) by mouth 2 (two) times a day. Blood clot prevention     methocarbamoL 750 MG Tab  Commonly known as: ROBAXIN  Take 1 tablet (750 mg total) by mouth every 6 (six) hours as needed (muscle spasms).     senna-docusate 8.6-50 mg 8.6-50 mg per tablet  Commonly known as: PERICOLACE  Take 2 tablets by mouth 2 (two) times a day. for 14 days            CONTINUE taking these medications      atorvastatin 40 MG tablet  Commonly known as: LIPITOR  Take 40 mg by mouth every evening.     calcium carbonate 600 mg calcium (1,500 mg) Tab  Commonly known as: OS-CLAY  Take 600 mg by mouth once daily. With vit D3 (400IU)     cranberry 500 mg Cap  Take 1 capsule by mouth once daily.     gabapentin 100 MG capsule  Commonly known as: NEURONTIN  Take 100 mg by mouth 2 (two) times daily.     levothyroxine 88 MCG tablet  Commonly known as: SYNTHROID  Take 88 mcg by mouth nightly.     lisinopriL 10 MG tablet  Take 10 mg by mouth nightly.     ONE DAILY MULTIVITAMIN per tablet  Generic drug: multivitamin  Take 1 tablet by mouth once daily.            STOP taking these medications      diclofenac 75 MG EC tablet  Commonly known as: VOLTAREN            ASK your doctor about these medications      ketorolac 10 mg tablet  Commonly known as: TORADOL  Take 1 tablet (10 mg total) by mouth every 8 (eight) hours. for  5 days  Ask about: Should I take this medication?     traMADoL 50 mg tablet  Commonly known as: ULTRAM  Take 1 tablet (50 mg total) by mouth every 4 (four) hours as needed for Pain.  Ask about: Should I take this medication?            No discharge procedures on file.   Follow-up Information       Ashkan Taylor MD. Go on 6/25/2024.    Specialty: Orthopedic Surgery  Why: Ortho follow up appt on Tuesday 6/25/24 @ 8:45am w/ Sheyla (Dr Taylor's NP)  Contact information:  [address]  [address]  [place] [address]  [phone]               [facility].    Specialty: Physical Therapy  Why: This is the outpatient therapy facility. They will contact pt with appt date & time. Call if you have questions or concerns.  Contact information:  [address]   [address]  [place] [facility] [address]  [phone]               [facility] Follow up.    Why: This is the equipment company. Call if you have questions or concerns.  Contact information:  [phone]

## 2024-06-24 NOTE — PROGRESS NOTES
I have seen the patient, reviewed the Nurse Practitioner's discharge summary. I have personally interviewed and examined the patient at bedside and: agree with the findings.     Ashkan Taylor MD  Orthopedic Surgery

## 2024-06-25 ENCOUNTER — OFFICE VISIT (OUTPATIENT)
Dept: ORTHOPEDICS | Facility: CLINIC | Age: 69
End: 2024-06-25
Payer: MEDICARE

## 2024-06-25 ENCOUNTER — HOSPITAL ENCOUNTER (OUTPATIENT)
Dept: RADIOLOGY | Facility: CLINIC | Age: 69
Discharge: HOME | End: 2024-06-25
Attending: NURSE PRACTITIONER
Payer: MEDICARE

## 2024-06-25 VITALS
BODY MASS INDEX: 34.66 KG/M2 | HEART RATE: 69 BPM | SYSTOLIC BLOOD PRESSURE: 111 MMHG | WEIGHT: 208 LBS | DIASTOLIC BLOOD PRESSURE: 69 MMHG | HEIGHT: 65 IN | TEMPERATURE: 98 F

## 2024-06-25 DIAGNOSIS — Z96.649 FAILED TOTAL HIP ARTHROPLASTY, INITIAL ENCOUNTER: Primary | ICD-10-CM

## 2024-06-25 DIAGNOSIS — Z96.649 FAILED TOTAL HIP ARTHROPLASTY, INITIAL ENCOUNTER: ICD-10-CM

## 2024-06-25 DIAGNOSIS — T84.018A FAILED TOTAL HIP ARTHROPLASTY, INITIAL ENCOUNTER: ICD-10-CM

## 2024-06-25 DIAGNOSIS — T84.018A FAILED TOTAL HIP ARTHROPLASTY, INITIAL ENCOUNTER: Primary | ICD-10-CM

## 2024-06-25 PROCEDURE — 3074F SYST BP LT 130 MM HG: CPT | Mod: CPTII,,, | Performed by: NURSE PRACTITIONER

## 2024-06-25 PROCEDURE — 3044F HG A1C LEVEL LT 7.0%: CPT | Mod: CPTII,,, | Performed by: NURSE PRACTITIONER

## 2024-06-25 PROCEDURE — 99024 POSTOP FOLLOW-UP VISIT: CPT | Mod: ,,, | Performed by: NURSE PRACTITIONER

## 2024-06-25 PROCEDURE — 4010F ACE/ARB THERAPY RXD/TAKEN: CPT | Mod: CPTII,,, | Performed by: NURSE PRACTITIONER

## 2024-06-25 PROCEDURE — 1159F MED LIST DOCD IN RCRD: CPT | Mod: CPTII,,, | Performed by: NURSE PRACTITIONER

## 2024-06-25 PROCEDURE — 3078F DIAST BP <80 MM HG: CPT | Mod: CPTII,,, | Performed by: NURSE PRACTITIONER

## 2024-06-25 PROCEDURE — 1101F PT FALLS ASSESS-DOCD LE1/YR: CPT | Mod: CPTII,,, | Performed by: NURSE PRACTITIONER

## 2024-06-25 PROCEDURE — 3288F FALL RISK ASSESSMENT DOCD: CPT | Mod: CPTII,,, | Performed by: NURSE PRACTITIONER

## 2024-06-25 PROCEDURE — 73502 X-RAY EXAM HIP UNI 2-3 VIEWS: CPT | Mod: LT,,, | Performed by: NURSE PRACTITIONER

## 2024-06-25 RX ORDER — TRAMADOL HYDROCHLORIDE 50 MG/1
50 TABLET ORAL EVERY 4 HOURS PRN
Qty: 42 TABLET | Refills: 0 | Status: CANCELLED | OUTPATIENT
Start: 2024-06-25 | End: 2024-07-02

## 2024-06-25 NOTE — PROGRESS NOTES
Chief Complaint:   Chief Complaint   Patient presents with    Post-op Evaluation     2wk sp left KARON revision sx 6/10/24. Xr today. States doing well overall. Working with PT which is going well. Denies pain or complaints other than some soreness after PT which is in lateral aspect of hip. Ambulating with walker.       History of present illness: Radha Alexandre is a 69 y.o. female, presents to clinic today in regards to left hip.  She is 2 weeks out from revision surgery.  Overall she is doing well.  Ambulating with the assistance of a walker.  Doing very well with this.  Currently in physical therapy for strengthening, range of motion, mobility.  This has working well for her.  Regards incision site denies any drainage or bleeding.  Dressing is clean dry and intact.      Past Medical History:   Diagnosis Date    Arthritis     Avulsion fracture of metatarsal bone of left foot     Bursitis of left hip     Heart valve regurgitation     High cholesterol     Hypertension     Thyroid disease        Past Surgical History:   Procedure Laterality Date    ARTHROCENTESIS OF HIP JOINT Left 04/26/2024    Procedure: ARTHROCENTESIS, HIP;  Surgeon: Ashkan Taylor MD;  Location: [facility];  Service: Orthopedics;  Laterality: Left;  left hip aspiration    COLONOSCOPY      CYSTOSCOPY      DILATION AND CURETTAGE OF UTERUS      JOINT REPLACEMENT  Dec 2017    Total left hip replacement    LAPAROSCOPY      RADIONUCLIDE BONE SCAN      REVISION TOTAL HIP ARTHROPLASTY Left 6/10/2024    Procedure: REVISION, TOTAL ARTHROPLASTY, HIP - ACETABULUM / PATHOLOGY/ SARAH STEM/ CELL SAVER;  Surgeon: Ashkan Taylor MD;  Location: [facility] OR;  Service: Orthopedics;  Laterality: Left;  revision L KARON, SPINAL       Current Outpatient Medications   Medication Sig    acetaminophen (TYLENOL) 500 MG tablet Take 1 tablet (500 mg total) by mouth every 4 (four) hours. for 14 days    aspirin 81 MG Chew Take 1 tablet (81 mg total) by mouth 2 (two) times a day.  Blood clot prevention    atorvastatin (LIPITOR) 40 MG tablet Take 40 mg by mouth every evening.    calcium carbonate (OS-CLAY) 600 mg calcium (1,500 mg) Tab Take 600 mg by mouth once daily. With vit D3 (400IU)    cranberry 500 mg Cap Take 1 capsule by mouth once daily.    gabapentin (NEURONTIN) 100 MG capsule Take 100 mg by mouth 2 (two) times daily.    levothyroxine (SYNTHROID) 88 MCG tablet Take 88 mcg by mouth nightly.    lisinopriL 10 MG tablet Take 10 mg by mouth nightly.    methocarbamoL (ROBAXIN) 750 MG Tab Take 1 tablet (750 mg total) by mouth every 6 (six) hours as needed (muscle spasms).    multivitamin (ONE DAILY MULTIVITAMIN) per tablet Take 1 tablet by mouth once daily.    senna-docusate 8.6-50 mg (PERICOLACE) 8.6-50 mg per tablet Take 2 tablets by mouth 2 (two) times a day. for 14 days     No current facility-administered medications for this visit.       Review of patient's allergies indicates:   Allergen Reactions    Honey     Penicillins Other (See Comments)     childhood    Shellfish containing products     Sulfa (sulfonamide antibiotics)     Wheat containing prod     Yeast     Latex, natural rubber        Family History   Problem Relation Name Age of Onset    Arthritis Mother Radha Janeu     Depression Mother Radha Janeu     Heart disease Mother Radha Janeu     Hypertension Mother Radha Janeu     Kidney disease Mother Radha Parsons     Stroke Mother Radha Janeu     Early death Father Romario GRAYArnaldo Parsons     Heart disease Father Romario Parsons     Hypertension Father Romario Parsons     Arthritis Brother Tavo Parsons     Cancer Brother Tavo Valenzuelayou     Kidney disease Brother Tavo Valenzuelayojoss     Cancer Brother Bernardo Parsons     Diabetes Brother Bernardo Valenzuelayojoss     Heart disease Sister Cori Green     Hypertension Sister Cori Green     Miscarriages / Stillbirths Sister CheloAna Green        Social History     Socioeconomic History    Marital  status:    Tobacco Use    Smoking status: Never    Smokeless tobacco: Never   Substance and Sexual Activity    Alcohol use: Yes     Alcohol/week: 1.0 standard drink of alcohol     Types: 1 Glasses of wine per week     Comment: Seldom    Drug use: Never    Sexual activity: Yes     Partners: Male     Birth control/protection: None           Review of Systems:    Denies fevers, chills, chest pain, shortness of breath. Comprehensive review of systems performed and otherwise negative except as noted in HPI      Physical Examination:    General: awake and alert, no acute distress, healthy appearing  Head and Neck: Head atraumatic/normocephalic. Moist MM  CV: brisk cap refill  Lungs: non-labored breathing, w/o cough or SOB  Skin: no rashes present, warm to touch  Neuro: sensation grossly intact distall     Vital Signs:    Vitals:    06/25/24 0907   BP: 111/69   Pulse: 69   Temp: 98.4 °F (36.9 °C)       Body mass index is 34.61 kg/m².    Left hip exam:    Left hip incision clean dry and intact. No erythema, drainage, or signs of infection  No swelling distally. No signs of DVT  Brisk cap refill left foot  Sensation intact distally to left foot  Positive FHL/EHL/gastrocsoleus/tib ant    X-rays: 3 views of the left hip reviewed. Patient's implants appear well fixed. No signs of loosening or subsidence noted.     Assessment:: post op s/p revision of L KARON    Plan:  Patient presents to clinic today in regards to her left hip.  She is 2 weeks out from surgery.  Doing well.  Stable on x-rays and exam today here in clinic.  Incision sites well healed and staples were discontinued.  Patient was educated she could shower without fully submerging in water x1 week.  She states understanding.  She is to follow up here in clinic in 1 month with x-rays to reassess her hip.  Patient states understanding and agrees with plan of care.    This note was created using M [employer] voice recognition software that occasionally misinterpreted  phrases or words.    Consult note is delivered via Epic messaging service.

## 2024-07-25 ENCOUNTER — OFFICE VISIT (OUTPATIENT)
Dept: ORTHOPEDICS | Facility: CLINIC | Age: 69
End: 2024-07-25
Payer: MEDICARE

## 2024-07-25 ENCOUNTER — HOSPITAL ENCOUNTER (OUTPATIENT)
Dept: RADIOLOGY | Facility: CLINIC | Age: 69
Discharge: HOME | End: 2024-07-25
Attending: NURSE PRACTITIONER
Payer: MEDICARE

## 2024-07-25 VITALS
HEART RATE: 64 BPM | WEIGHT: 211 LBS | DIASTOLIC BLOOD PRESSURE: 75 MMHG | BODY MASS INDEX: 35.16 KG/M2 | HEIGHT: 65 IN | SYSTOLIC BLOOD PRESSURE: 135 MMHG

## 2024-07-25 DIAGNOSIS — Z47.1 AFTERCARE FOLLOWING LEFT HIP JOINT REPLACEMENT SURGERY: ICD-10-CM

## 2024-07-25 DIAGNOSIS — T84.018A FAILED TOTAL HIP ARTHROPLASTY, INITIAL ENCOUNTER: ICD-10-CM

## 2024-07-25 DIAGNOSIS — Z96.649 FAILED TOTAL HIP ARTHROPLASTY, INITIAL ENCOUNTER: ICD-10-CM

## 2024-07-25 DIAGNOSIS — Z96.649 FAILED TOTAL HIP ARTHROPLASTY, INITIAL ENCOUNTER: Primary | ICD-10-CM

## 2024-07-25 DIAGNOSIS — Z96.642 AFTERCARE FOLLOWING LEFT HIP JOINT REPLACEMENT SURGERY: ICD-10-CM

## 2024-07-25 DIAGNOSIS — T84.018A FAILED TOTAL HIP ARTHROPLASTY, INITIAL ENCOUNTER: Primary | ICD-10-CM

## 2024-07-25 PROCEDURE — 99024 POSTOP FOLLOW-UP VISIT: CPT | Mod: ,,, | Performed by: NURSE PRACTITIONER

## 2024-07-25 PROCEDURE — 1159F MED LIST DOCD IN RCRD: CPT | Mod: CPTII,,, | Performed by: NURSE PRACTITIONER

## 2024-07-25 PROCEDURE — 3075F SYST BP GE 130 - 139MM HG: CPT | Mod: CPTII,,, | Performed by: NURSE PRACTITIONER

## 2024-07-25 PROCEDURE — 3288F FALL RISK ASSESSMENT DOCD: CPT | Mod: CPTII,,, | Performed by: NURSE PRACTITIONER

## 2024-07-25 PROCEDURE — 3044F HG A1C LEVEL LT 7.0%: CPT | Mod: CPTII,,, | Performed by: NURSE PRACTITIONER

## 2024-07-25 PROCEDURE — 1101F PT FALLS ASSESS-DOCD LE1/YR: CPT | Mod: CPTII,,, | Performed by: NURSE PRACTITIONER

## 2024-07-25 PROCEDURE — 73502 X-RAY EXAM HIP UNI 2-3 VIEWS: CPT | Mod: LT,,, | Performed by: NURSE PRACTITIONER

## 2024-07-25 PROCEDURE — 3078F DIAST BP <80 MM HG: CPT | Mod: CPTII,,, | Performed by: NURSE PRACTITIONER

## 2024-07-25 PROCEDURE — 4010F ACE/ARB THERAPY RXD/TAKEN: CPT | Mod: CPTII,,, | Performed by: NURSE PRACTITIONER

## 2024-07-25 NOTE — PROGRESS NOTES
Chief Complaint:   Chief Complaint   Patient presents with    Follow-up     LT KARON REVISION SX 6/10/24. XR TODAY. States doing well overall. Some pain here and there with certain movements. This pain is global to hip and sometimes radiates down thigh. Has been working with therapy.       History of present illness: Radha Alexander is a 69 y.o. female, presents to clinic today in regards to her left hip.  Patient is 6 weeks out from revision.  Overall she is improving.  Does complain of some lateral pain of her hip.  This does sometimes radiate down her thigh.  Worsened after therapy.  Does state that therapy was asking if she would like to be lasered.  States that she would ask here before going forward with this.  Currently not on any pain medication states that she is back on her diclofenac.  She is ambulating with the assistance of a cane and doing well with this.  Also states that she does feel as though she gets tired more often.      Past Medical History:   Diagnosis Date    Arthritis     Avulsion fracture of metatarsal bone of left foot     Bursitis of left hip     Heart valve regurgitation     High cholesterol     Hypertension     Thyroid disease        Past Surgical History:   Procedure Laterality Date    ARTHROCENTESIS OF HIP JOINT Left 04/26/2024    Procedure: ARTHROCENTESIS, HIP;  Surgeon: Ashkan Taylor MD;  Location: [facility] OR;  Service: Orthopedics;  Laterality: Left;  left hip aspiration    COLONOSCOPY      CYSTOSCOPY      DILATION AND CURETTAGE OF UTERUS      JOINT REPLACEMENT  Dec 2017    Total left hip replacement    LAPAROSCOPY      RADIONUCLIDE BONE SCAN      REVISION TOTAL HIP ARTHROPLASTY Left 6/10/2024    Procedure: REVISION, TOTAL ARTHROPLASTY, HIP - ACETABULUM / PATHOLOGY/ SARAH STEM/ CELL SAVER;  Surgeon: Ashkan Taylor MD;  Location: [facility] OR;  Service: Orthopedics;  Laterality: Left;  revision L KARON, SPINAL       Current Outpatient Medications   Medication Sig    atorvastatin  (LIPITOR) 40 MG tablet Take 40 mg by mouth every evening.    calcium carbonate (OS-CLAY) 600 mg calcium (1,500 mg) Tab Take 600 mg by mouth once daily. With vit D3 (400IU)    cranberry 500 mg Cap Take 1 capsule by mouth once daily.    gabapentin (NEURONTIN) 100 MG capsule Take 100 mg by mouth 2 (two) times daily.    levothyroxine (SYNTHROID) 88 MCG tablet Take 88 mcg by mouth nightly.    lisinopriL 10 MG tablet Take 10 mg by mouth nightly.    multivitamin (ONE DAILY MULTIVITAMIN) per tablet Take 1 tablet by mouth once daily.    aspirin 81 MG Chew Take 1 tablet (81 mg total) by mouth 2 (two) times a day. Blood clot prevention     No current facility-administered medications for this visit.       Review of patient's allergies indicates:   Allergen Reactions    Honey     Penicillins Other (See Comments)     childhood    Shellfish containing products     Sulfa (sulfonamide antibiotics)     Wheat containing prod     Yeast     Latex, natural rubber        Family History   Problem Relation Name Age of Onset    Arthritis Mother Radha Valenzuelamk     Depression Mother Radha Janeu     Heart disease Mother Radha Janeu     Hypertension Mother Radha Janeu     Kidney disease Mother Radha Parsons     Stroke Mother Radha Janeu     Early death Father Romario Valenzuelaaaronu     Heart disease Father Romario Valenzuelamk     Hypertension Father Romario Valenzuelamk     Arthritis Brother Tavo THOMAS Nicolasyojoss     Cancer Brother Tavo Valenzuelayou     Kidney disease Brother Tavo THOMAS Issa     Cancer Brother Bernardo TERESSA Issa     Diabetes Brother Bernardo JONAHArnaldo Parsons     Heart disease Sister Cori ARNOLDArnaldo Green     Hypertension Sister Cori Perez FLYNN Green     Miscarriages / Stillbirths Sister Cori ARNOLDArnaldo Green        Social History     Socioeconomic History    Marital status:    Tobacco Use    Smoking status: Never    Smokeless tobacco: Never   Substance and Sexual Activity    Alcohol use: Yes     Alcohol/week: 1.0 standard drink of alcohol      Types: 1 Glasses of wine per week     Comment: Seldom    Drug use: Never    Sexual activity: Yes     Partners: Male     Birth control/protection: None           Review of Systems:    Denies fevers, chills, chest pain, shortness of breath. Comprehensive review of systems performed and otherwise negative except as noted in HPI      Physical Examination:    General: awake and alert, no acute distress, healthy appearing  Head and Neck: Head atraumatic/normocephalic. Moist MM  CV: brisk cap refill  Lungs: non-labored breathing, w/o cough or SOB  Skin: no rashes present, warm to touch  Neuro: sensation grossly intact distall     Vital Signs:    Vitals:    07/25/24 1045   BP: 135/75   Pulse: 64       Body mass index is 35.11 kg/m².    Left hip exam:    Left hip incision clean dry and intact. No erythema, drainage, or signs of infection  No swelling distally. No signs of DVT  Brisk cap refill left foot  Sensation intact distally to left foot  Positive FHL/EHL/gastrocsoleus/tib ant    X-rays: 3 views of the left hip reviewed. Patient's implants appear well fixed. No signs of loosening or subsidence noted.     Assessment:: post op s/p a revision of L KARON    Plan:  Patient presents to clinic today in regards to her left hip.  She is 6 weeks status post revision.  Her hip is stable on exam x-rays today here in clinic.  Seems as though most of her pain is lateral due to bursitis tendinitis and some muscular pain.  Patient was a cane to have therapy work on this with given a prescription.  Also patient advised that she is only 6 weeks out and that most of her symptoms that she is experiencing are normal.  And give this time to improve.  We will have her follow up in the office in 2 months to reassess her hip.  No x-rays needed at that time.  Patient states understanding and agrees with plan of care.    This note was created using [employer] voice recognition software that occasionally misinterpreted phrases or words.    Consult  note is delivered via Epic messaging service.

## 2024-09-26 ENCOUNTER — OFFICE VISIT (OUTPATIENT)
Dept: ORTHOPEDICS | Facility: CLINIC | Age: 69
End: 2024-09-26
Payer: MEDICARE

## 2024-09-26 VITALS
SYSTOLIC BLOOD PRESSURE: 136 MMHG | DIASTOLIC BLOOD PRESSURE: 86 MMHG | HEART RATE: 80 BPM | HEIGHT: 65 IN | WEIGHT: 211 LBS | BODY MASS INDEX: 35.16 KG/M2

## 2024-09-26 DIAGNOSIS — T84.018A FAILED TOTAL HIP ARTHROPLASTY, INITIAL ENCOUNTER: Primary | ICD-10-CM

## 2024-09-26 DIAGNOSIS — M70.62 TROCHANTERIC BURSITIS, LEFT HIP: ICD-10-CM

## 2024-09-26 DIAGNOSIS — Z96.649 FAILED TOTAL HIP ARTHROPLASTY, INITIAL ENCOUNTER: Primary | ICD-10-CM

## 2024-09-26 RX ORDER — LIDOCAINE HYDROCHLORIDE 20 MG/ML
5 INJECTION, SOLUTION EPIDURAL; INFILTRATION; INTRACAUDAL; PERINEURAL
Status: DISCONTINUED | OUTPATIENT
Start: 2024-09-26 | End: 2024-09-26 | Stop reason: HOSPADM

## 2024-09-26 RX ORDER — DICLOFENAC SODIUM 75 MG/1
TABLET, DELAYED RELEASE ORAL
COMMUNITY

## 2024-09-26 RX ORDER — BETAMETHASONE SODIUM PHOSPHATE AND BETAMETHASONE ACETATE 3; 3 MG/ML; MG/ML
12 INJECTION, SUSPENSION INTRA-ARTICULAR; INTRALESIONAL; INTRAMUSCULAR; SOFT TISSUE
Status: DISCONTINUED | OUTPATIENT
Start: 2024-09-26 | End: 2024-09-26 | Stop reason: HOSPADM

## 2024-09-26 RX ADMIN — BETAMETHASONE SODIUM PHOSPHATE AND BETAMETHASONE ACETATE 12 MG: 3; 3 INJECTION, SUSPENSION INTRA-ARTICULAR; INTRALESIONAL; INTRAMUSCULAR; SOFT TISSUE at 09:09

## 2024-09-26 RX ADMIN — LIDOCAINE HYDROCHLORIDE 5 ML: 20 INJECTION, SOLUTION EPIDURAL; INFILTRATION; INTRACAUDAL; PERINEURAL at 09:09

## 2024-09-26 NOTE — PROCEDURES
Large Joint Aspiration/Injection: L greater trochanteric bursa    Date/Time: 9/26/2024 9:30 AM    Performed by: Sheyla Terrazas FNP  Authorized by: Sheyla Terrazas FNP    Consent Done?:  Yes (Verbal)  Indications:  Pain  Timeout: prior to procedure the correct patient, procedure, and site was verified    Prep: patient was prepped and draped in usual sterile fashion    Local anesthesia used?: No      Details:  Needle Size:  22 G  Approach:  Lateral  Location:  Hip  Site:  L greater trochanteric bursa  Medications:  5 mL LIDOcaine (PF) 20 mg/mL (2%) 20 mg/mL (2 %); 12 mg betamethasone acetate-betamethasone sodium phosphate 6 mg/mL  Patient tolerance:  Patient tolerated the procedure well with no immediate complications

## 2024-09-26 NOTE — PROGRESS NOTES
Chief Complaint:   Chief Complaint   Patient presents with    Follow-up     3mo sp LT KARON revision sx 6/10/24. Doing well overall. States some pain still presenting with overuse and some certain movements. Also states some stiffness on the anterior aspect. Finished with therapy.       History of present illness: Radha Alexandre is a 69 y.o. female, presents to clinic today in regards to her left hip.  Patient was about 3 months out from revision.  Overall she is doing well.  Ambulating without any assistance.  Has completed her course of physical therapy.  Main complaint is tenderness to the outside of her hip.  She does have a history of bursitis.  She states that this has what it feels like.  This has knee her incision site.  States that this started after going up some stairs in her house.  Has continued despite being on diclofenac.        Past Medical History:   Diagnosis Date    Arthritis     Avulsion fracture of metatarsal bone of left foot     Bursitis of left hip     Heart valve regurgitation     High cholesterol     Hypertension     Thyroid disease        Past Surgical History:   Procedure Laterality Date    ARTHROCENTESIS OF HIP JOINT Left 04/26/2024    Procedure: ARTHROCENTESIS, HIP;  Surgeon: Ashkan Taylor MD;  Location: [facility];  Service: Orthopedics;  Laterality: Left;  left hip aspiration    COLONOSCOPY      CYSTOSCOPY      DILATION AND CURETTAGE OF UTERUS      JOINT REPLACEMENT  Dec 2017    Total left hip replacement    LAPAROSCOPY      RADIONUCLIDE BONE SCAN      REVISION TOTAL HIP ARTHROPLASTY Left 6/10/2024    Procedure: REVISION, TOTAL ARTHROPLASTY, HIP - ACETABULUM / PATHOLOGY/ SARAH STEM/ CELL SAVER;  Surgeon: Ashkan Taylor MD;  Location: [facility] OR;  Service: Orthopedics;  Laterality: Left;  revision L KARON, SPINAL       Current Outpatient Medications   Medication Sig    atorvastatin (LIPITOR) 40 MG tablet Take 40 mg by mouth every evening.    calcium carbonate (OS-CLAY) 600 mg calcium  (1,500 mg) Tab Take 600 mg by mouth once daily. With vit D3 (400IU)    cranberry 500 mg Cap Take 1 capsule by mouth once daily.    diclofenac (VOLTAREN) 75 MG EC tablet     levothyroxine (SYNTHROID) 88 MCG tablet Take 88 mcg by mouth nightly.    lisinopriL 10 MG tablet Take 10 mg by mouth nightly.    multivitamin (ONE DAILY MULTIVITAMIN) per tablet Take 1 tablet by mouth once daily.    aspirin 81 MG Chew Take 1 tablet (81 mg total) by mouth 2 (two) times a day. Blood clot prevention    gabapentin (NEURONTIN) 100 MG capsule Take 100 mg by mouth 2 (two) times daily.     No current facility-administered medications for this visit.       Review of patient's allergies indicates:   Allergen Reactions    Honey     Penicillins Other (See Comments)     childhood    Shellfish containing products     Sulfa (sulfonamide antibiotics)     Wheat containing prod     Yeast     Latex, natural rubber        Family History   Problem Relation Name Age of Onset    Arthritis Mother Radha Parsons     Depression Mother Radha Janeu     Heart disease Mother Radha Parsons     Hypertension Mother Radha Janeu     Kidney disease Mother Radha Janeu     Stroke Mother Radha Janeu     Early death Father Romario Janeu     Heart disease Father Romario Parsons     Hypertension Father Romario Parsons     Arthritis Brother Tavo Valenzuelayou     Cancer Brother Tavo Valenzuelayou     Kidney disease Brother Tavo Janeu     Cancer Brother Bernardo Parsons     Diabetes Brother Bernardo Parsons     Heart disease Sister Cori Green     Hypertension Sister Cori PRUETT Green     Miscarriages / Stillbirths Sister Cori Green        Social History     Socioeconomic History    Marital status:    Tobacco Use    Smoking status: Never    Smokeless tobacco: Never   Substance and Sexual Activity    Alcohol use: Yes     Alcohol/week: 1.0 standard drink of alcohol     Types: 1 Glasses of wine per week     Comment: Seldom     Drug use: Never    Sexual activity: Yes     Partners: Male     Birth control/protection: None           Review of Systems:    Denies fevers, chills, chest pain, shortness of breath. Comprehensive review of systems performed and otherwise negative except as noted in HPI      Physical Examination:    General: awake and alert, no acute distress, healthy appearing  Head and Neck: Head atraumatic/normocephalic. Moist MM  CV: brisk cap refill  Lungs: non-labored breathing, w/o cough or SOB  Skin: no rashes present, warm to touch  Neuro: sensation grossly intact distall     Vital Signs:    Vitals:    09/26/24 0936   BP: 136/86   Pulse: 80       Body mass index is 35.11 kg/m².    Left hip exam:    Left hip incision clean dry and intact. No erythema, drainage, or signs of infection  No swelling distally. No signs of DVT  Brisk cap refill left foot  Sensation intact distally to left foot  Positive FHL/EHL/gastrocsoleus/tib ant  Tenderness to palpation along the greater trochanter left hip    Assessment:: post op s/p revision of L KARON and trochanteric bursitis left hip    Plan:  Patient presents to clinic today in regards to her left hip.  Her hip is stable on exam x-rays today here in clinic.  Upon examination she does seem to have some bursitis of the left hip.  We will give her a cortisone injection today to help calm this down as she has been taking her diclofenac without much relief.  She is to continue her daily exercises.  Also to continue stretching.  We will have her follow up in about 3 months to reassess her bursitis in her hip.  Patient was educated to call if needing to be seen sooner for this.  She states understanding and agrees with plan of care.    This note was created using [employer] voice recognition software that occasionally misinterpreted phrases or words.    Consult note is delivered via Epic messaging service.

## 2024-11-20 ENCOUNTER — TELEPHONE (OUTPATIENT)
Dept: ORTHOPEDICS | Facility: CLINIC | Age: 69
End: 2024-11-20
Payer: MEDICARE

## 2024-11-20 NOTE — TELEPHONE ENCOUNTER
I called Leyda back. Leyda is out of the office today.   Spoke to Rolanda. She will have Leyda call us back tomorrow.

## 2024-11-22 NOTE — TELEPHONE ENCOUNTER
I called the patients dentist office back again.   Leyda is out of the office.   Spoke to Nubia.     Nubia states that the patients previous surgeon for her left hip replacement gave orders of pre medication (antibiotics) for life.   They are asking if Dr. Taylor wants to continue that or what's his recommendations/protocol?     Nubia is faxing over a clearance letter to our office to specify recommendations.    3 (mild pain)

## 2024-11-22 NOTE — TELEPHONE ENCOUNTER
I spoke to Sheyla - she states that Dr. Taylor's recommendation is 2 years post joint replacements however since she was previously informed that she should do pre antibiotics for lifetime that it is up to the patient if she wants to do the antibiotics for life or 2 years.     I called the patient and informed her of what is stated above.   Patient states that she would like to proceed with Dr. Taylor's recommendations of pre dental work/cleaning antibiotics for 2 years from surgery.     Dentist office is sending over a clearance/order to be complete/signed. Please state what is stated above.

## 2024-12-02 ENCOUNTER — TELEPHONE (OUTPATIENT)
Dept: ORTHOPEDICS | Facility: CLINIC | Age: 69
End: 2024-12-02
Payer: MEDICARE

## 2024-12-02 NOTE — TELEPHONE ENCOUNTER
Called back to let her know she does not need abx for a colonoscopy per Sheyla. Just wanted to make sure because she had surgery prior. Patient understood verbal.

## 2024-12-23 ENCOUNTER — OFFICE VISIT (OUTPATIENT)
Dept: ORTHOPEDICS | Facility: CLINIC | Age: 69
End: 2024-12-23
Payer: MEDICARE

## 2024-12-23 VITALS
HEIGHT: 65 IN | WEIGHT: 216.81 LBS | BODY MASS INDEX: 36.12 KG/M2 | DIASTOLIC BLOOD PRESSURE: 83 MMHG | SYSTOLIC BLOOD PRESSURE: 120 MMHG | HEART RATE: 74 BPM

## 2024-12-23 DIAGNOSIS — Z47.1 AFTERCARE FOLLOWING LEFT HIP JOINT REPLACEMENT SURGERY: Primary | ICD-10-CM

## 2024-12-23 DIAGNOSIS — Z96.642 AFTERCARE FOLLOWING LEFT HIP JOINT REPLACEMENT SURGERY: Primary | ICD-10-CM

## 2024-12-23 PROCEDURE — 99214 OFFICE O/P EST MOD 30 MIN: CPT | Mod: ,,, | Performed by: ORTHOPAEDIC SURGERY

## 2024-12-23 PROCEDURE — 3044F HG A1C LEVEL LT 7.0%: CPT | Mod: CPTII,,, | Performed by: ORTHOPAEDIC SURGERY

## 2024-12-23 PROCEDURE — 1159F MED LIST DOCD IN RCRD: CPT | Mod: CPTII,,, | Performed by: ORTHOPAEDIC SURGERY

## 2024-12-23 PROCEDURE — 3288F FALL RISK ASSESSMENT DOCD: CPT | Mod: CPTII,,, | Performed by: ORTHOPAEDIC SURGERY

## 2024-12-23 PROCEDURE — 3008F BODY MASS INDEX DOCD: CPT | Mod: CPTII,,, | Performed by: ORTHOPAEDIC SURGERY

## 2024-12-23 PROCEDURE — 1125F AMNT PAIN NOTED PAIN PRSNT: CPT | Mod: CPTII,,, | Performed by: ORTHOPAEDIC SURGERY

## 2024-12-23 PROCEDURE — 1101F PT FALLS ASSESS-DOCD LE1/YR: CPT | Mod: CPTII,,, | Performed by: ORTHOPAEDIC SURGERY

## 2024-12-23 PROCEDURE — 3074F SYST BP LT 130 MM HG: CPT | Mod: CPTII,,, | Performed by: ORTHOPAEDIC SURGERY

## 2024-12-23 PROCEDURE — 3079F DIAST BP 80-89 MM HG: CPT | Mod: CPTII,,, | Performed by: ORTHOPAEDIC SURGERY

## 2024-12-23 PROCEDURE — 4010F ACE/ARB THERAPY RXD/TAKEN: CPT | Mod: CPTII,,, | Performed by: ORTHOPAEDIC SURGERY

## 2024-12-23 NOTE — PROGRESS NOTES
Chief Complaint:   Chief Complaint   Patient presents with    Left Hip - Follow-up     Patient is here for F/U Lt KARON revision sx 6/10/24, cortisone on 9/26/24, patient is still having a little discomfort in hip but feels better.        History of present illness:    History of Present Illness  The patient presents for evaluation of tendinitis and bursitis.    She reports a significant improvement in her tendinitis and bursitis symptoms following her hip replacement surgery on 06/10/2024. She acknowledges the importance of physical therapy and stretching exercises in her recovery process but admits to not adhering to these practices. She expresses a desire to resume these activities before the onset of the new year. An injection administered previously provided temporary relief for a few days.    Past Medical History:   Diagnosis Date    Arthritis     Avulsion fracture of metatarsal bone of left foot     Bursitis of left hip     Heart valve regurgitation     High cholesterol     Hypertension     Sleep apnea, unspecified     Thyroid disease        Past Surgical History:   Procedure Laterality Date    ARTHROCENTESIS OF HIP JOINT Left 04/26/2024    Procedure: ARTHROCENTESIS, HIP;  Surgeon: Ashkan Taylor MD;  Location: [facility];  Service: Orthopedics;  Laterality: Left;  left hip aspiration    COLONOSCOPY      CYSTOSCOPY      DILATION AND CURETTAGE OF UTERUS      JOINT REPLACEMENT  Dec 2017    Total left hip replacement    LAPAROSCOPY      RADIONUCLIDE BONE SCAN      REVISION TOTAL HIP ARTHROPLASTY Left 6/10/2024    Procedure: REVISION, TOTAL ARTHROPLASTY, HIP - ACETABULUM / PATHOLOGY/ SARAH STEM/ CELL SAVER;  Surgeon: Ashkan Taylor MD;  Location: [facility] OR;  Service: Orthopedics;  Laterality: Left;  revision L KARON, SPINAL       Current Outpatient Medications   Medication Sig    atorvastatin (LIPITOR) 40 MG tablet Take 40 mg by mouth every evening.    calcium carbonate (OS-CLAY) 600 mg calcium (1,500 mg) Tab  Take 600 mg by mouth once daily. With vit D3 (400IU)    cranberry 500 mg Cap Take 1 capsule by mouth once daily.    diclofenac (VOLTAREN) 75 MG EC tablet     levothyroxine (SYNTHROID) 88 MCG tablet Take 88 mcg by mouth nightly.    lisinopriL 10 MG tablet Take 10 mg by mouth nightly.    multivitamin (ONE DAILY MULTIVITAMIN) per tablet Take 1 tablet by mouth once daily.     No current facility-administered medications for this visit.       Review of patient's allergies indicates:   Allergen Reactions    Honey     Penicillins Other (See Comments)     childhood    Shellfish containing products     Sulfa (sulfonamide antibiotics)     Wheat containing prod     Yeast     Latex, natural rubber        Family History   Problem Relation Name Age of Onset    Arthritis Mother Radha Parsons     Depression Mother Radha Parsons     Heart disease Mother Radha Parsons     Hypertension Mother Radha Parsons     Kidney disease Mother Radha Parsons     Stroke Mother Radha Parsons     Early death Father Romario Parsons     Heart disease Father Romario Parsons     Hypertension Father Romario Parsons     Arthritis Brother Tavo Valenzuelayou     Cancer Brother Tavo Valenzuelayou     Kidney disease Brother Tavo Valenzuelayou     Cancer Brother Bernardo Valenzuelayojoss     Diabetes Brother Bernardo Valenzuelayou     Heart disease Sister Cori Green     Hypertension Sister Cori Green     Miscarriages / Stillbirths Sister Cori Green        Social History     Socioeconomic History    Marital status:    Tobacco Use    Smoking status: Never    Smokeless tobacco: Never   Substance and Sexual Activity    Alcohol use: Yes     Alcohol/week: 1.0 standard drink of alcohol     Types: 1 Glasses of wine per week     Comment: Seldom    Drug use: Never    Sexual activity: Yes     Partners: Male     Birth control/protection: None           Review of Systems:    Constitution: Negative for chills, fever, and sweats.  Negative for  unexplained weight loss.    HENT:  Negative for headaches and blurry vision.    Cardiovascular:Negative for chest pain or irregular heart beat. Negative for hypertension.    Respiratory:  Negative for cough and shortness of breath.    Gastrointestinal: Negative for abdominal pain, heartburn, melena, nausea, and vomitting.    Genitourinary:  Negative bladder incontinence and dysuria.    Musculoskeletal:  See HPI    Neurological: Negative for numbness.    Psychiatric/Behavioral: Negative for depression.  The patient is not nervous/anxious.      Endocrine: Negative for polyuria    Hematologic/Lymphatic: Negative for bleeding problem.  Does not bruise/bleed easily.    Skin: Negative for poor would healing and rash      Physical Examination:    Vital Signs:    Vitals:    12/23/24 1009   BP: 120/83   Pulse: 74       Body mass index is 36.08 kg/m².    General: No acute distress, alert and oriented, healthy appearing    HEENT: Head is atraumatic, mucous membranes are moist    Neck: Supples, no JVD    Cardiovascular: Palpable dorsalis pedis and posterior tibial pulses, regular rate and rhythm to those pulses    Lungs: Breathing non-labored    Skin: no rashes appreciated    Neurologic: Can flex and extend knees, ankles, and toes. Sensation is grossly intact    Left hip:  Range of motion left hip without significant or severe pain.  Brisk cap of the graft with a did refill distally.  Sensation mild point tenderness laterally over greater trochanter.    X-rays:      Assessment::  Status post revision left hip  Trochanteric bursitis left hip    Plan:  Patient is seeing good improvement.  She is going to get on and restart her home exercise program.  We did discuss physical therapy.  She is not wish to proceed with this.  See her back in 6 months at her previous a year appointment for repeat x-rays of the left hip.  The patient wishes to physical therapy, she has been contact our office.    This note was generated with the  assistance of ambient listening technology. Verbal consent was obtained by the patient and accompanying visitor(s) for the recording of patient appointment to facilitate this note. I attest to having reviewed and edited the generated note for accuracy, though some syntax or spelling errors may persist. Please contact the author of this note for any clarification.      This note was created using [employer] voice recognition software that occasionally misinterpreted phrases or words.    Consult note is delivered via Epic messaging service.

## 2025-01-29 ENCOUNTER — HOSPITAL ENCOUNTER (EMERGENCY)
Facility: HOSPITAL | Age: 70
Discharge: HOME | End: 2025-01-29
Attending: EMERGENCY MEDICINE
Payer: MEDICARE

## 2025-01-29 VITALS
HEART RATE: 63 BPM | RESPIRATION RATE: 13 BRPM | SYSTOLIC BLOOD PRESSURE: 136 MMHG | DIASTOLIC BLOOD PRESSURE: 72 MMHG | WEIGHT: 220 LBS | OXYGEN SATURATION: 100 % | BODY MASS INDEX: 36.65 KG/M2 | TEMPERATURE: 98 F | HEIGHT: 65 IN

## 2025-01-29 DIAGNOSIS — M25.552 ACUTE HIP PAIN, LEFT: ICD-10-CM

## 2025-01-29 DIAGNOSIS — S73.005A CLOSED DISLOCATION OF LEFT HIP, INITIAL ENCOUNTER: Primary | ICD-10-CM

## 2025-01-29 PROCEDURE — 63600175 PHARM REV CODE 636 W HCPCS: Performed by: EMERGENCY MEDICINE

## 2025-01-29 PROCEDURE — 99153 MOD SED SAME PHYS/QHP EA: CPT

## 2025-01-29 PROCEDURE — 27265 TREAT HIP DISLOCATION: CPT | Mod: LT

## 2025-01-29 PROCEDURE — 99900035 HC TECH TIME PER 15 MIN (STAT)

## 2025-01-29 PROCEDURE — 99152 MOD SED SAME PHYS/QHP 5/>YRS: CPT

## 2025-01-29 PROCEDURE — 99285 EMERGENCY DEPT VISIT HI MDM: CPT | Mod: 25

## 2025-01-29 RX ORDER — FENTANYL CITRATE 50 UG/ML
100 INJECTION, SOLUTION INTRAMUSCULAR; INTRAVENOUS
Status: COMPLETED | OUTPATIENT
Start: 2025-01-29 | End: 2025-01-29

## 2025-01-29 RX ORDER — PROPOFOL 10 MG/ML
70 VIAL (ML) INTRAVENOUS ONCE
Status: COMPLETED | OUTPATIENT
Start: 2025-01-29 | End: 2025-01-29

## 2025-01-29 RX ORDER — HYDROCODONE BITARTRATE AND ACETAMINOPHEN 7.5; 325 MG/1; MG/1
1 TABLET ORAL EVERY 6 HOURS PRN
Qty: 20 TABLET | Refills: 0 | Status: SHIPPED | OUTPATIENT
Start: 2025-01-29 | End: 2025-02-03

## 2025-01-29 RX ADMIN — PROPOFOL 70 MG: 10 INJECTION, EMULSION INTRAVENOUS at 02:01

## 2025-01-29 RX ADMIN — FENTANYL CITRATE 100 MCG: 50 INJECTION, SOLUTION INTRAMUSCULAR; INTRAVENOUS at 12:01

## 2025-01-29 RX ADMIN — PROPOFOL 50 MG: 10 INJECTION, EMULSION INTRAVENOUS at 02:01

## 2025-01-29 NOTE — ED PROVIDER NOTES
"Encounter Date: 1/29/2025       History     Chief Complaint   Patient presents with    Hip Pain     The history is provided by the patient.   Hip Pain  This is a new problem. The current episode started less than 1 hour ago. The problem occurs constantly. The problem has not changed since onset.Pertinent negatives include no chest pain and no shortness of breath. Exacerbated by: movement of LLE. Nothing relieves the symptoms.   Notes she is 7 months s/p revision of left KARON by Dr. Taylor.  She was bending over, working in her flower beds when she felt a "pop" followed by immediate pain, causing her to fall to the ground.  No other complaints.    Review of patient's allergies indicates:   Allergen Reactions    Honey     Penicillins Other (See Comments)     childhood    Shellfish containing products     Sulfa (sulfonamide antibiotics)     Wheat containing prod     Yeast     Latex, natural rubber      Past Medical History:   Diagnosis Date    Arthritis     Avulsion fracture of metatarsal bone of left foot     Bursitis of left hip     Heart valve regurgitation     High cholesterol     Hypertension     Sleep apnea, unspecified     Thyroid disease      Past Surgical History:   Procedure Laterality Date    ARTHROCENTESIS OF HIP JOINT Left 04/26/2024    Procedure: ARTHROCENTESIS, HIP;  Surgeon: Ashkan Taylor MD;  Location: [facility] OR;  Service: Orthopedics;  Laterality: Left;  left hip aspiration    COLONOSCOPY      CYSTOSCOPY      DILATION AND CURETTAGE OF UTERUS      JOINT REPLACEMENT  Dec 2017    Total left hip replacement    LAPAROSCOPY      RADIONUCLIDE BONE SCAN      REVISION TOTAL HIP ARTHROPLASTY Left 6/10/2024    Procedure: REVISION, TOTAL ARTHROPLASTY, HIP - ACETABULUM / PATHOLOGY/ SARAH STEM/ CELL SAVER;  Surgeon: Ashkan Taylor MD;  Location: [facility] OR;  Service: Orthopedics;  Laterality: Left;  revision L KARON, SPINAL     Family History   Problem Relation Name Age of Onset    Arthritis Mother Radha TODD" Freyou     Depression Mother Radha Valenzuelayou     Heart disease Mother Radha Valenzuelayojoss     Hypertension Mother Radha Valenzuelayou     Kidney disease Mother Radha Valenzuelayojoss     Stroke Mother Radha Parsons     Early death Father Romario SALGADO Freyou     Heart disease Father Romario Valenzuelayojoss     Hypertension Father Romario Valenzuelayojoss     Arthritis Brother Tavo THOMAS Freyou     Cancer Brother Tavo THOMAS Freyou     Kidney disease Brother Tavo Valenzuelayou     Cancer Brother Bernardo Valenzuelayou     Diabetes Brother Bernardo Valenzuelayou     Heart disease Sister Cori Green     Hypertension Sister Cori Green     Miscarriages / Stillbirths Sister Cori Green      Social History     Tobacco Use    Smoking status: Never    Smokeless tobacco: Never   Substance Use Topics    Alcohol use: Yes     Alcohol/week: 1.0 standard drink of alcohol     Types: 1 Glasses of wine per week     Comment: Seldom    Drug use: Never     Review of Systems   Constitutional:  Negative for fever.   HENT:  Negative for sore throat.    Respiratory:  Negative for shortness of breath.    Cardiovascular:  Negative for chest pain.   Gastrointestinal:  Negative for nausea.   Genitourinary:  Negative for dysuria.   Musculoskeletal:  Negative for back pain.   Skin:  Negative for rash.   Neurological:  Negative for weakness.   Hematological:  Does not bruise/bleed easily.       Physical Exam     Initial Vitals [01/29/25 1216]   BP Pulse Resp Temp SpO2   (!) 158/75 68 18 98.3 °F (36.8 °C) 100 %      MAP       --         Physical Exam    Nursing note and vitals reviewed.  Constitutional: She appears well-developed and well-nourished.   HENT:   Head: Normocephalic and atraumatic.   Right Ear: External ear normal.   Left Ear: External ear normal.   Eyes: Conjunctivae and EOM are normal. Pupils are equal, round, and reactive to light.   Neck: Neck supple.   Normal range of motion.  Cardiovascular:  Normal rate, regular rhythm, normal heart sounds and intact  distal pulses.           Pulmonary/Chest: Breath sounds normal.   Abdominal: Abdomen is soft. Bowel sounds are normal.   Musculoskeletal:         General: Normal range of motion.      Cervical back: Normal range of motion and neck supple.        Legs:       Comments: Marked shortening of LLE     Neurological: She is alert and oriented to person, place, and time. GCS score is 15. GCS eye subscore is 4. GCS verbal subscore is 5. GCS motor subscore is 6.   Skin: Skin is warm and dry. Capillary refill takes less than 2 seconds.   Psychiatric: She has a normal mood and affect. Her behavior is normal. Judgment and thought content normal.         ED Course   Orthopedic Injury    Date/Time: 1/29/2025 2:08 PM    Performed by: Davonte Fisher MD  Authorized by: Davonte Fisher MD    Location procedure was performed:  [facility] EMERGENCY DEPARTMENT  Pre-operative diagnosis:  Left hip prosthesis dislocation  Post-operative diagnosis:  Same  Consent Done?:  Yes  Universal Protocol:     Verbal consent obtained?: Yes      Written consent obtained?: Yes      Risks and benefits: Risks, benefits and alternatives were discussed      Consent given by:  Patient    Imaging studies available: Yes      Patient identity confirmed:  Verbally with patient  Injury:     Injury location:  Hip    Location details:  Left hip    Injury type:  Dislocation      Pre-procedure assessment:     Neurovascular status: Neurovascularly intact      Patient sedated?: Yes      ASA Class:  Class 2 - Mild Illness without functional impairment.    Mallampati Score:  Class 2 - Visualization of the soft palate, fauces, and uvula.    Patient/Family history of anesthesia or sedation complications: No      Sedation type: moderate (conscious) sedation      Sedation:  Propofol    Analgesia:  Fentanyl    Sedation start:  1/29/2025 2:00 PM    Sedation end:  1/29/2025 2:17 PM      Selections made in this section will also lock the Injury type section above.:      Manipulation performed?: Yes      Reduction method:  Traction and counter traction    Reduction method:  Traction and counter traction    Reduction method:  Traction and counter traction    Reduction method:  Traction and counter traction    Reduction method:  Traction and counter traction    Reduction method:  Traction and counter traction    Reduction successful?: Yes      Confirmation: Reduction confirmed by x-ray      Immobilization: knee immobilizer.    Complications: No      Estimated blood loss (mL):  0    Specimens: No      Implants: No    Post-procedure assessment:     Neurovascular status: Neurovascularly intact      Labs Reviewed - No data to display       Imaging Results              X-Ray Hip 1 View Left (with Pelvis when performed) (Final result)  Result time 01/29/25 14:41:40      Final result by Ramo Handley MD (01/29/25 14:41:40)                   Impression:      Reduction of prosthetic dislocation.      Electronically signed by: Ramo Handley  Date:    01/29/2025  Time:    14:41               Narrative:    EXAMINATION:  XR HIP 1 VIEW LEFT (WITH PELVIS WHEN PERFORMED)    CLINICAL HISTORY:  post-reduction;    COMPARISON:  None.    FINDINGS:  The dislocation of the left hip prosthesis has been reduced position and alignment is satisfactory    No acute displaced fractures or dislocations.    Joint spaces preserved.    No blastic or lytic lesions.    Soft tissues within normal limits.                                       X-Ray Hip 2 or 3 views Left with Pelvis when performed (Final result)  Result time 01/29/25 13:09:34      Final result by Ramo Handley MD (01/29/25 13:09:34)                   Impression:      Dislocated hip prosthesis.    Degenerative changes      Electronically signed by: Ramo Handley  Date:    01/29/2025  Time:    13:09               Narrative:    EXAMINATION:  COMPLETE LEFT HIP AND PELVIS X-RAYS:    CPT: 03039, 01342    CLINICAL HISTORY:  Pain in left  hip    FINDINGS:  Examination reveals evidence of a left prosthesis dislocation.  There are degenerative changes with narrowing of the inferior medial margin of the right hip and degenerative changes of the sacroiliac joints and lumbosacral spine articular spaces otherwise preserved with smooth articular surfaces                                       Medications   fentaNYL injection 100 mcg (100 mcg Intravenous Given 1/29/25 1229)   propofol (DIPRIVAN) 10 mg/mL IVP (120 mg Intravenous Given by Provider 1/29/25 1401)     Medical Decision Making  Amount and/or Complexity of Data Reviewed  Radiology: ordered and independent interpretation performed. Decision-making details documented in ED Course.    Risk  Prescription drug management.  Parenteral controlled substances.    Differential includes:  dislocated prosthesis, periprosthetic fracture.  Will give analgesic and obtain hip x-rays.           ED Course as of 01/29/25 1515   Wed Jan 29, 2025   1514 I spoke with Dr. Taylor.  He will have office contact her with a F/U appointment. [CL]      ED Course User Index  [CL] Davonte Fisher MD                           Clinical Impression:  Final diagnoses:  [M25.552] Acute hip pain, left  [S73.005A] Closed dislocation of left hip, initial encounter (Primary)          ED Disposition Condition    Discharge Stable          ED Prescriptions       Medication Sig Dispense Start Date End Date Auth. Provider    HYDROcodone-acetaminophen (NORCO) 7.5-325 mg per tablet Take 1 tablet by mouth every 6 (six) hours as needed for Pain. Final diagnoses: [M25.552] Acute hip pain, left [S73.005A] Closed dislocation of left hip, initial encounter (Primary) 20 tablet 1/29/2025 2/3/2025 Davonte Fisher MD          Follow-up Information       Follow up With Specialties Details Why Contact Info    Ashkan Taylor MD Orthopedic Surgery   [address]  [address]  [facility] [id number] [phone]               Phillip  Davonte LI MD  01/29/25 5525

## 2025-01-29 NOTE — ED TRIAGE NOTES
"Here via aasi c/o lt hip pain after feeling a "pop" while doing garden work this am-fell to ground. + shortening. Pain increases with movement. Lt hip replaced 2017 then a revision by teresa gibbons 6/2024  "

## 2025-01-30 ENCOUNTER — OFFICE VISIT (OUTPATIENT)
Dept: ORTHOPEDICS | Facility: CLINIC | Age: 70
End: 2025-01-30
Payer: MEDICARE

## 2025-01-30 VITALS
DIASTOLIC BLOOD PRESSURE: 78 MMHG | HEART RATE: 75 BPM | SYSTOLIC BLOOD PRESSURE: 108 MMHG | WEIGHT: 195.38 LBS | BODY MASS INDEX: 32.55 KG/M2 | HEIGHT: 65 IN

## 2025-01-30 DIAGNOSIS — Z96.649 FAILED TOTAL HIP ARTHROPLASTY, INITIAL ENCOUNTER: Primary | ICD-10-CM

## 2025-01-30 DIAGNOSIS — T84.018A FAILED TOTAL HIP ARTHROPLASTY, INITIAL ENCOUNTER: Primary | ICD-10-CM

## 2025-01-30 RX ORDER — PROPOFOL 10 MG/ML
VIAL (ML) INTRAVENOUS CODE/TRAUMA/SEDATION MEDICATION
Status: COMPLETED | OUTPATIENT
Start: 2025-01-29 | End: 2025-01-29

## 2025-01-30 NOTE — PROGRESS NOTES
Chief Complaint:   Chief Complaint   Patient presents with    Hip Pain     Lt Hip dislocation - Pt dislocated 01/29/25 while bending over, felt a pop. Pt reports pain more in lateral buttocks than the groin. States pain has gotten better since ER. Pt presents with a brace and walker. Pt taking tylenol PRN, doesn't need pain meds. Pt reports 4 on pain scale.        History of present illness:    History of Present Illness  The patient presents for evaluation of hip dislocation.    She experienced a sudden onset of pain in her hip, accompanied by a popping sensation, while bending over from the waist to perform a gardening task. This incident resulted in a fall on her side. Despite the pain, she was able to mobilize her leg sufficiently to sit up. She does not recall the position of her foot during this event. Emergency medical services were contacted, and upon their arrival, it was noted that her left leg appeared shorter than her right. She was subsequently transported to the emergency room where attempts were made to reduce the dislocated hip. She is curious about the potential impact of the mathew angle on the dislocation and is seeking advice on any specific movements or positions she should avoid to prevent future dislocations. She also inquires about the possibility of driving and attending other medical appointments while wearing the brace. Additionally, she is interested in understanding if physical therapy would be beneficial in her case. She underwent a hip replacement surgery performed by Dr. Lala.    Past Medical History:   Diagnosis Date    Arthritis     Avulsion fracture of metatarsal bone of left foot     Bursitis of left hip     Heart valve regurgitation     High cholesterol     Hypertension     Sleep apnea, unspecified     Thyroid disease        Past Surgical History:   Procedure Laterality Date    ARTHROCENTESIS OF HIP JOINT Left 04/26/2024    Procedure: ARTHROCENTESIS, HIP;  Surgeon: Askhan Taylor  MD RAJNI;  Location: [facility] OR;  Service: Orthopedics;  Laterality: Left;  left hip aspiration    COLONOSCOPY  12/2024    CYSTOSCOPY      DILATION AND CURETTAGE OF UTERUS      JOINT REPLACEMENT  Dec 2017    Total left hip replacement    LAPAROSCOPY      RADIONUCLIDE BONE SCAN      REVISION TOTAL HIP ARTHROPLASTY Left 06/10/2024    Procedure: REVISION, TOTAL ARTHROPLASTY, HIP - ACETABULUM / PATHOLOGY/ SARAH STEM/ CELL SAVER;  Surgeon: Ashkan Taylor MD;  Location: [facility] OR;  Service: Orthopedics;  Laterality: Left;  revision L KARON, SPINAL       Current Outpatient Medications   Medication Sig    atorvastatin (LIPITOR) 40 MG tablet Take 40 mg by mouth every evening.    calcium carbonate (OS-CLAY) 600 mg calcium (1,500 mg) Tab Take 600 mg by mouth once daily. With vit D3 (400IU)    cranberry 500 mg Cap Take 1 capsule by mouth once daily.    diclofenac (VOLTAREN) 75 MG EC tablet     levothyroxine (SYNTHROID) 88 MCG tablet Take 88 mcg by mouth nightly.    lisinopriL 10 MG tablet Take 10 mg by mouth nightly.    multivitamin (ONE DAILY MULTIVITAMIN) per tablet Take 1 tablet by mouth once daily.    HYDROcodone-acetaminophen (NORCO) 7.5-325 mg per tablet Take 1 tablet by mouth every 6 (six) hours as needed for Pain. Final diagnoses: [M25.552] Acute hip pain, left [S73.005A] Closed dislocation of left hip, initial encounter (Primary) (Patient not taking: Reported on 1/30/2025)     No current facility-administered medications for this visit.       Review of patient's allergies indicates:   Allergen Reactions    Honey     Penicillins Other (See Comments)     childhood    Shellfish containing products     Sulfa (sulfonamide antibiotics)     Wheat containing prod     Yeast     Latex, natural rubber        Family History   Problem Relation Name Age of Onset    Arthritis Mother Radha Parsons     Depression Mother Radha Parsons     Heart disease Mother Radha Parsons     Hypertension Mother Radha Parsons     Kidney disease  Mother Radha Parsons     Stroke Mother Radha Parsons     Early death Father Romario Parsons     Heart disease Father Romario Parsons     Hypertension Father Romario Parsons     Arthritis Brother Tavo Parsons     Cancer Brother Tavo Parsons     Kidney disease Brother Tavo Parsons     Cancer Brother Bernardo Parsons     Diabetes Brother Bernardo Parsons     Heart disease Sister Cori Green     Hypertension Sister Cori Green     Miscarriages / Stillbirths Sister Cori Green        Social History     Socioeconomic History    Marital status:    Tobacco Use    Smoking status: Never    Smokeless tobacco: Never   Substance and Sexual Activity    Alcohol use: Yes     Alcohol/week: 1.0 standard drink of alcohol     Types: 1 Glasses of wine per week     Comment: Seldom    Drug use: Never    Sexual activity: Yes     Partners: Male     Birth control/protection: None           Review of Systems:    Constitution: Negative for chills, fever, and sweats.  Negative for unexplained weight loss.    HENT:  Negative for headaches and blurry vision.    Cardiovascular:Negative for chest pain or irregular heart beat. Negative for hypertension.    Respiratory:  Negative for cough and shortness of breath.    Gastrointestinal: Negative for abdominal pain, heartburn, melena, nausea, and vomitting.    Genitourinary:  Negative bladder incontinence and dysuria.    Musculoskeletal:  See HPI    Neurological: Negative for numbness.    Psychiatric/Behavioral: Negative for depression.  The patient is not nervous/anxious.      Endocrine: Negative for polyuria    Hematologic/Lymphatic: Negative for bleeding problem.  Does not bruise/bleed easily.    Skin: Negative for poor would healing and rash      Physical Examination:    Vital Signs:    Vitals:    01/30/25 1256   BP: 108/78   Pulse: 75       Body mass index is 32.52 kg/m².    General: No acute distress, alert and oriented, healthy appearing    HEENT: Head  is atraumatic, mucous membranes are moist    Neck: Supples, no JVD    Cardiovascular: Palpable dorsalis pedis and posterior tibial pulses, regular rate and rhythm to those pulses    Lungs: Breathing non-labored    Skin: no rashes appreciated    Neurologic: Can flex and extend knees, ankles, and toes. Sensation is grossly intact    Left hip:  Range of motion left hip without significant or severe pain.  Brisk cap refill distally.  Knee immobilizer is in place.    X-rays:  Three views left hip reviewed.  Patient's implants appear stable position.  It had no signs of subsidence loosening or change in position.  Concentric reduction     Assessment::  Status post failed total hip arthroplasty with location    Plan:  Plan to place her in a hip abduction brace initially to try to this hip and placed with the next 3 months.  She is to wear this 247 except for hygiene.  We will see him back in 6-8 weeks with repeat x-rays of the left hip when she returns    This note was generated with the assistance of ambient listening technology. Verbal consent was obtained by the patient and accompanying visitor(s) for the recording of patient appointment to facilitate this note. I attest to having reviewed and edited the generated note for accuracy, though some syntax or spelling errors may persist. Please contact the author of this note for any clarification.      This note was created using [employer] voice recognition software that occasionally misinterpreted phrases or words.    Consult note is delivered via Epic messaging service.

## 2025-04-01 ENCOUNTER — OFFICE VISIT (OUTPATIENT)
Dept: ORTHOPEDICS | Facility: CLINIC | Age: 70
End: 2025-04-01
Payer: MEDICARE

## 2025-04-01 ENCOUNTER — HOSPITAL ENCOUNTER (OUTPATIENT)
Dept: RADIOLOGY | Facility: CLINIC | Age: 70
Discharge: HOME | End: 2025-04-01
Attending: ORTHOPAEDIC SURGERY
Payer: MEDICARE

## 2025-04-01 VITALS
HEART RATE: 60 BPM | DIASTOLIC BLOOD PRESSURE: 79 MMHG | BODY MASS INDEX: 32.69 KG/M2 | HEIGHT: 65 IN | SYSTOLIC BLOOD PRESSURE: 154 MMHG | WEIGHT: 196.19 LBS

## 2025-04-01 DIAGNOSIS — M25.552 LEFT HIP PAIN: ICD-10-CM

## 2025-04-01 DIAGNOSIS — M25.552 LEFT HIP PAIN: Primary | ICD-10-CM

## 2025-04-01 PROCEDURE — 73502 X-RAY EXAM HIP UNI 2-3 VIEWS: CPT | Mod: LT,,, | Performed by: ORTHOPAEDIC SURGERY

## 2025-04-01 NOTE — PROGRESS NOTES
Chief Complaint:   Chief Complaint   Patient presents with    Left Hip - Follow-up     F/U L hip dislocation. Pt states hip still hurts but more of an ache. Pain is off and on. Feels like it wants to pop out of socket again. No new concerns with it.        History of present illness:    History of Present Illness  The patient presents for evaluation of hip pain.    She reports persistent, daily, aching pain in her hip, which has been ongoing for the past 2 months following a dislocation. The pain is described as intermittent and unpredictable. She expresses apprehension about removing her brace, fearing instability. She recalls that the initial dislocation occurred during a bending movement. She has been performing leg lifts and clam exercises but expresses hesitation about side leg lifts. Additionally, she reports a squeaking sound from her hip. She has been cautious in her movements, particularly when navigating stairs, and limits her steps to 2 or 3 when entering the shower. Her initial hip surgery was performed by Dr. Daniel Quintana.    Past Medical History:   Diagnosis Date    Arthritis     Avulsion fracture of metatarsal bone of left foot     Bursitis of left hip     Heart valve regurgitation     High cholesterol     Hypertension     Sleep apnea, unspecified     Thyroid disease        Past Surgical History:   Procedure Laterality Date    ARTHROCENTESIS OF HIP JOINT Left 04/26/2024    Procedure: ARTHROCENTESIS, HIP;  Surgeon: Ashkan Taylor MD;  Location: [facility];  Service: Orthopedics;  Laterality: Left;  left hip aspiration    COLONOSCOPY  12/2024    CYSTOSCOPY      DILATION AND CURETTAGE OF UTERUS      JOINT REPLACEMENT  Dec 2017    Total left hip replacement    LAPAROSCOPY      RADIONUCLIDE BONE SCAN      REVISION TOTAL HIP ARTHROPLASTY Left 06/10/2024    Procedure: REVISION, TOTAL ARTHROPLASTY, HIP - ACETABULUM / PATHOLOGY/ SARAH STEM/ CELL SAVER;  Surgeon: Ashkan Taylor MD;  Location: [facility] OR;   Service: Orthopedics;  Laterality: Left;  revision L KARON, SPINAL       Current Outpatient Medications   Medication Sig    atorvastatin (LIPITOR) 40 MG tablet Take 40 mg by mouth every evening.    calcium carbonate (OS-CLAY) 600 mg calcium (1,500 mg) Tab Take 600 mg by mouth once daily. With vit D3 (400IU)    cranberry 500 mg Cap Take 1 capsule by mouth once daily.    diclofenac (VOLTAREN) 75 MG EC tablet     levothyroxine (SYNTHROID) 88 MCG tablet Take 88 mcg by mouth nightly.    lisinopriL 10 MG tablet Take 10 mg by mouth nightly.    multivitamin (ONE DAILY MULTIVITAMIN) per tablet Take 1 tablet by mouth once daily.     No current facility-administered medications for this visit.       Review of patient's allergies indicates:   Allergen Reactions    Honey     Penicillins Other (See Comments)     childhood    Shellfish containing products     Sulfa (sulfonamide antibiotics)     Wheat containing prod     Yeast     Latex, natural rubber        Family History   Problem Relation Name Age of Onset    Arthritis Mother Radha Parsons     Depression Mother Radha Parsons     Heart disease Mother Radha Parsons     Hypertension Mother Radha Janeu     Kidney disease Mother Radha Janeu     Stroke Mother Radha Parsons     Early death Father Romario Parsons     Heart disease Father Romario Parsons     Hypertension Father Romario Parsons     Arthritis Brother Tavo Janejoss     Cancer Brother Tavo Janeu     Kidney disease Brother Tavo Parsons     Cancer Brother Bernardo Parsons     Diabetes Brother Bernardo Parsons     Heart disease Sister Cori Green     Hypertension Sister Cori Green     Miscarriages / Stillbirths Sister Cori Green        Social History[1]        Review of Systems:    Constitution: Negative for chills, fever, and sweats.  Negative for unexplained weight loss.    HENT:  Negative for headaches and blurry vision.    Cardiovascular:Negative for chest pain or  irregular heart beat. Negative for hypertension.    Respiratory:  Negative for cough and shortness of breath.    Gastrointestinal: Negative for abdominal pain, heartburn, melena, nausea, and vomitting.    Genitourinary:  Negative bladder incontinence and dysuria.    Musculoskeletal:  See HPI    Neurological: Negative for numbness.    Psychiatric/Behavioral: Negative for depression.  The patient is not nervous/anxious.      Endocrine: Negative for polyuria    Hematologic/Lymphatic: Negative for bleeding problem.  Does not bruise/bleed easily.    Skin: Negative for poor would healing and rash      Physical Examination:    Vital Signs:    Vitals:    04/01/25 0905   BP: (!) 154/79   Pulse: 60       Body mass index is 32.65 kg/m².    General: No acute distress, alert and oriented, healthy appearing    HEENT: Head is atraumatic, mucous membranes are moist    Neck: Supples, no JVD    Cardiovascular: Palpable dorsalis pedis and posterior tibial pulses, regular rate and rhythm to those pulses    Lungs: Breathing non-labored    Skin: no rashes appreciated    Neurologic: Can flex and extend knees, ankles, and toes. Sensation is grossly intact    Left hip:  Range of motion left hip without significant discomfort or pain.  Brisk cap refill distally distally.  Sensation intact distally.    X-rays:  Three views left hip reviewed.  Patient's implants appear well fixed with no signs of loosening or subsidence noted     Assessment::  Status post left total hip arthroplasty with postoperative dislocation    Plan:  10 used to brace for another month.  We will see her back in 2 months.  Repeat x-rays when she returns.    This note was generated with the assistance of ambient listening technology. Verbal consent was obtained by the patient and accompanying visitor(s) for the recording of patient appointment to facilitate this note. I attest to having reviewed and edited the generated note for accuracy, though some syntax or spelling  errors may persist. Please contact the author of this note for any clarification.      This note was created using [employer] voice recognition software that occasionally misinterpreted phrases or words.    Consult note is delivered via Epic messaging service.         [1]   Social History  Socioeconomic History    Marital status:    Tobacco Use    Smoking status: Never    Smokeless tobacco: Never   Substance and Sexual Activity    Alcohol use: Yes     Alcohol/week: 1.0 standard drink of alcohol     Types: 1 Glasses of wine per week     Comment: Seldom    Drug use: Never    Sexual activity: Yes     Partners: Male     Birth control/protection: None

## 2025-05-13 ENCOUNTER — HOSPITAL ENCOUNTER (EMERGENCY)
Facility: HOSPITAL | Age: 70
Discharge: HOME | End: 2025-05-13
Attending: EMERGENCY MEDICINE
Payer: MEDICARE

## 2025-05-13 VITALS
RESPIRATION RATE: 16 BRPM | TEMPERATURE: 98 F | HEART RATE: 64 BPM | DIASTOLIC BLOOD PRESSURE: 89 MMHG | BODY MASS INDEX: 35.32 KG/M2 | HEIGHT: 65 IN | SYSTOLIC BLOOD PRESSURE: 135 MMHG | WEIGHT: 212 LBS | OXYGEN SATURATION: 98 %

## 2025-05-13 DIAGNOSIS — M25.559 HIP PAIN: ICD-10-CM

## 2025-05-13 DIAGNOSIS — S73.005A CLOSED DISLOCATION OF LEFT HIP, INITIAL ENCOUNTER: Primary | ICD-10-CM

## 2025-05-13 PROCEDURE — 63600175 PHARM REV CODE 636 W HCPCS: Performed by: EMERGENCY MEDICINE

## 2025-05-13 PROCEDURE — 27265 TREAT HIP DISLOCATION: CPT

## 2025-05-13 PROCEDURE — 99900035 HC TECH TIME PER 15 MIN (STAT)

## 2025-05-13 PROCEDURE — 99285 EMERGENCY DEPT VISIT HI MDM: CPT | Mod: 25

## 2025-05-13 PROCEDURE — 99152 MOD SED SAME PHYS/QHP 5/>YRS: CPT

## 2025-05-13 RX ORDER — HYDROCODONE BITARTRATE AND ACETAMINOPHEN 7.5; 325 MG/1; MG/1
1 TABLET ORAL EVERY 6 HOURS PRN
Qty: 20 TABLET | Refills: 0 | Status: SHIPPED | OUTPATIENT
Start: 2025-05-13 | End: 2025-05-18

## 2025-05-13 RX ORDER — PROPOFOL 10 MG/ML
60 VIAL (ML) INTRAVENOUS ONCE
Status: DISCONTINUED | OUTPATIENT
Start: 2025-05-13 | End: 2025-05-13 | Stop reason: HOSPADM

## 2025-05-13 RX ORDER — PROPOFOL 10 MG/ML
VIAL (ML) INTRAVENOUS CODE/TRAUMA/SEDATION MEDICATION
Status: COMPLETED | OUTPATIENT
Start: 2025-05-13 | End: 2025-05-13

## 2025-05-13 RX ADMIN — PROPOFOL 60 MG: 10 INJECTION, EMULSION INTRAVENOUS at 01:05

## 2025-05-13 RX ADMIN — PROPOFOL 40 MG: 10 INJECTION, EMULSION INTRAVENOUS at 01:05

## 2025-05-13 NOTE — ED TRIAGE NOTES
Here via aasi c/o lt hip pain/deformity after getting up from chair. Hx of hip dislocation in past.

## 2025-05-13 NOTE — ED PROVIDER NOTES
Encounter Date: 5/13/2025       History     Chief Complaint   Patient presents with    Hip Pain     The history is provided by the patient.   Hip Pain  This is a recurrent problem. The current episode started 1 to 2 hours ago. The problem occurs constantly. Pertinent negatives include no chest pain and no shortness of breath. The symptoms are aggravated by bending. The symptoms are relieved by medications.   Went to stand from a seated position and left hip prosthesis seemingly dislocated (this would be her second dislocation since undergoing revision KARON about 11 months ago.    Review of patient's allergies indicates:   Allergen Reactions    Honey     Penicillins Other (See Comments)     childhood    Shellfish containing products     Sulfa (sulfonamide antibiotics)     Wheat containing prod     Yeast     Latex, natural rubber      Past Medical History:   Diagnosis Date    Arthritis     Avulsion fracture of metatarsal bone of left foot     Bursitis of left hip     Heart valve regurgitation     High cholesterol     Hypertension     Sleep apnea, unspecified     Thyroid disease      Past Surgical History:   Procedure Laterality Date    ARTHROCENTESIS OF HIP JOINT Left 04/26/2024    Procedure: ARTHROCENTESIS, HIP;  Surgeon: Ashkan Taylor MD;  Location: [facility];  Service: Orthopedics;  Laterality: Left;  left hip aspiration    COLONOSCOPY  12/2024    CYSTOSCOPY      DILATION AND CURETTAGE OF UTERUS      JOINT REPLACEMENT  Dec 2017    Total left hip replacement    LAPAROSCOPY      RADIONUCLIDE BONE SCAN      REVISION TOTAL HIP ARTHROPLASTY Left 06/10/2024    Procedure: REVISION, TOTAL ARTHROPLASTY, HIP - ACETABULUM / PATHOLOGY/ SARAH STEM/ CELL SAVER;  Surgeon: Ashkan Taylor MD;  Location: [facility] OR;  Service: Orthopedics;  Laterality: Left;  revision L KARON, SPINAL     Family History   Problem Relation Name Age of Onset    Arthritis Mother Radha Parsons     Depression Mother Radha Parsons     Heart disease  Mother Radha Parsons     Hypertension Mother Radha Parsons     Kidney disease Mother Radha Parsons     Stroke Mother Radha Parsons     Early death Father Romario Parsons     Heart disease Father Romario Parsons     Hypertension Father Romario Parsons     Arthritis Brother Tavo Valenzuelayojoss     Cancer Brother Tavo Valenzuelayojoss     Kidney disease Brother Tavo Valenzuelayojoss     Cancer Brother Bernardo Valenzuelayojoss     Diabetes Brother Bernardo Valenzuelayojoss     Heart disease Sister Cori Green     Hypertension Sister Cori Green     Miscarriages / Stillbirths Sister Cori Green      Social History[1]  Review of Systems   Constitutional:  Negative for fever.   HENT:  Negative for sore throat.    Respiratory:  Negative for shortness of breath.    Cardiovascular:  Negative for chest pain.   Gastrointestinal:  Negative for nausea.   Genitourinary:  Negative for dysuria.   Musculoskeletal:  Negative for back pain.   Skin:  Negative for rash.   Neurological:  Negative for weakness.   Hematological:  Does not bruise/bleed easily.       Physical Exam     Initial Vitals [05/13/25 1313]   BP Pulse Resp Temp SpO2   (!) 152/73 64 16 97.9 °F (36.6 °C) 100 %      MAP       --         Physical Exam    Nursing note and vitals reviewed.  Constitutional: She appears well-developed and well-nourished.   HENT:   Head: Normocephalic and atraumatic.   Right Ear: External ear normal.   Left Ear: External ear normal.   Eyes: Conjunctivae and EOM are normal. Pupils are equal, round, and reactive to light.   Neck: Neck supple.   Normal range of motion.  Cardiovascular:  Normal rate, regular rhythm, normal heart sounds and intact distal pulses.           Pulmonary/Chest: Breath sounds normal.   Abdominal: Abdomen is soft. Bowel sounds are normal.   Musculoskeletal:      Cervical back: Normal range of motion and neck supple.      Left hip: Tenderness present. Decreased range of motion.        Legs:       Comments: LLE is shortened      Neurological: She is alert and oriented to person, place, and time. GCS score is 15. GCS eye subscore is 4. GCS verbal subscore is 5. GCS motor subscore is 6.   Skin: Skin is warm and dry. Capillary refill takes less than 2 seconds.   Psychiatric: She has a normal mood and affect. Her behavior is normal. Judgment and thought content normal.         ED Course   Orthopedic Injury    Date/Time: 5/13/2025 1:56 PM    Performed by: Davonte Fisher MD  Authorized by: Davonte Fisher MD    Location procedure was performed:  [facility] EMERGENCY DEPARTMENT  Pre-operative diagnosis:  Left hip prosthesis dislocation  Post-operative diagnosis:  Same  Consent Done?:  Yes  Universal Protocol:     Verbal consent obtained?: Yes      Written consent obtained?: Yes      Consent given by:  Patient    Imaging studies available: Yes      Patient identity confirmed:  Verbally with patient  Injury:     Injury location:  Hip    Location details:  Left hip    Injury type:  Dislocation    Dislocation type: anterior      Spontaneous?: Yes      Prosthesis?: Yes        Pre-procedure assessment:     Neurovascular status: Neurovascularly intact      Local anesthesia used?: No      Patient sedated?: Yes      ASA Class:  Class 2 - Mild Illness without functional impairment.    Mallampati Score:  Class 2 - Visualization of the soft palate, fauces, and uvula.    Patient/Family history of anesthesia or sedation complications: No      Sedation type: moderate (conscious) sedation      Sedation:  Propofol    Sedation start:  5/13/2025 1:51 PM    Sedation end:  5/13/2025 2:05 PM    Vital signs: Vital signs monitored during sedation        Selections made in this section will also lock the Injury type section above.:     Manipulation performed?: Yes      Reduction method:  Traction and counter traction    Reduction method:  Traction and counter traction    Reduction method:  Traction and counter traction    Reduction method:  Traction and  counter traction    Reduction method:  Traction and counter traction    Reduction method:  Traction and counter traction    Reduction successful?: Yes      Confirmation: Reduction confirmed by x-ray      Immobilization: adduction brace.    Complications: No      Estimated blood loss (mL):  0    Specimens: No      Implants: No    Post-procedure assessment:     Neurovascular status: Neurovascularly intact      Labs Reviewed - No data to display       Imaging Results              X-Ray Hip 1 View Left (with Pelvis when performed) (In process)                      X-Ray Hip 1 View Left (with Pelvis when performed) (In process)                      Medications   propofol (DIPRIVAN) 10 mg/mL IVP (has no administration in time range)   propofol (DIPRIVAN) 10 mg/mL IVP (60 mg Intravenous Given 5/13/25 4034)     Medical Decision Making  Amount and/or Complexity of Data Reviewed  Radiology: ordered and independent interpretation performed. Decision-making details documented in ED Course.    Risk  Prescription drug management.    Differential includes:  dislocation, fracture.  Will obtain x-rays.  If recurrent dislocation, will notify Dr. Taylor.  Plan for closed reduction under moderate sedation.                                  Clinical Impression:  Final diagnoses:  [M25.559] Hip pain  [S73.005A] Closed dislocation of left hip, initial encounter - hip prosthesis (Primary)          ED Disposition Condition    Discharge Stable          ED Prescriptions       Medication Sig Dispense Start Date End Date Auth. Provider    HYDROcodone-acetaminophen (NORCO) 7.5-325 mg per tablet Take 1 tablet by mouth every 6 (six) hours as needed for Pain. Final diagnoses: [M25.559] Hip pain [S73.005A] Closed dislocation of left hip, initial encounter - hip prosthesis (Primary) 20 tablet 5/13/2025 5/18/2025 Davonte Fisher MD          Follow-up Information       Follow up With Specialties Details Why Contact Info    Ashkan Taylor,  MD Orthopedic Surgery Schedule an appointment as soon as possible for a visit   [address] [place] [address]  [address]  [facility] [id number] [phone]                   [1]   Social History  Tobacco Use    Smoking status: Never    Smokeless tobacco: Never   Substance Use Topics    Alcohol use: Yes     Alcohol/week: 1.0 standard drink of alcohol     Types: 1 Glasses of wine per week     Comment: Seldom    Drug use: Never        Davonte Fisher MD  05/13/25 5146

## 2025-05-27 ENCOUNTER — HOSPITAL ENCOUNTER (OUTPATIENT)
Dept: RADIOLOGY | Facility: CLINIC | Age: 70
Discharge: HOME | End: 2025-05-27
Attending: ORTHOPAEDIC SURGERY
Payer: MEDICARE

## 2025-05-27 ENCOUNTER — OFFICE VISIT (OUTPATIENT)
Dept: ORTHOPEDICS | Facility: CLINIC | Age: 70
End: 2025-05-27
Payer: MEDICARE

## 2025-05-27 ENCOUNTER — LAB VISIT (OUTPATIENT)
Dept: LAB | Facility: HOSPITAL | Age: 70
End: 2025-05-27
Attending: ORTHOPAEDIC SURGERY
Payer: MEDICARE

## 2025-05-27 VITALS
SYSTOLIC BLOOD PRESSURE: 108 MMHG | TEMPERATURE: 98 F | HEART RATE: 76 BPM | BODY MASS INDEX: 35.33 KG/M2 | HEIGHT: 65 IN | DIASTOLIC BLOOD PRESSURE: 69 MMHG | WEIGHT: 212.06 LBS

## 2025-05-27 DIAGNOSIS — Z96.642 AFTERCARE FOLLOWING LEFT HIP JOINT REPLACEMENT SURGERY: ICD-10-CM

## 2025-05-27 DIAGNOSIS — T84.021D FAILURE OF LEFT TOTAL HIP ARTHROPLASTY WITH DISLOCATION OF HIP, SUBSEQUENT ENCOUNTER: ICD-10-CM

## 2025-05-27 DIAGNOSIS — Z47.1 AFTERCARE FOLLOWING LEFT HIP JOINT REPLACEMENT SURGERY: Primary | ICD-10-CM

## 2025-05-27 DIAGNOSIS — Z47.1 AFTERCARE FOLLOWING LEFT HIP JOINT REPLACEMENT SURGERY: ICD-10-CM

## 2025-05-27 DIAGNOSIS — Z96.642 PRESENCE OF LEFT ARTIFICIAL HIP JOINT: ICD-10-CM

## 2025-05-27 DIAGNOSIS — Z96.642 AFTERCARE FOLLOWING LEFT HIP JOINT REPLACEMENT SURGERY: Primary | ICD-10-CM

## 2025-05-27 LAB
BASOPHILS # BLD AUTO: 0.02 X10(3)/MCL
BASOPHILS NFR BLD AUTO: 0.2 %
CRP SERPL HS-MCNC: 0.67 MG/L
EOSINOPHIL # BLD AUTO: 0.26 X10(3)/MCL (ref 0–0.9)
EOSINOPHIL NFR BLD AUTO: 3 %
ERYTHROCYTE [DISTWIDTH] IN BLOOD BY AUTOMATED COUNT: 13.8 % (ref 11.5–17)
ERYTHROCYTE [SEDIMENTATION RATE] IN BLOOD: 14 MM/HR (ref 0–20)
HCT VFR BLD AUTO: 43.1 % (ref 37–47)
HGB BLD-MCNC: 13.9 G/DL (ref 12–16)
IMM GRANULOCYTES # BLD AUTO: 0.01 X10(3)/MCL (ref 0–0.04)
IMM GRANULOCYTES NFR BLD AUTO: 0.1 %
LYMPHOCYTES # BLD AUTO: 1.86 X10(3)/MCL (ref 0.6–4.6)
LYMPHOCYTES NFR BLD AUTO: 21.6 %
MCH RBC QN AUTO: 30.4 PG (ref 27–31)
MCHC RBC AUTO-ENTMCNC: 32.3 G/DL (ref 33–36)
MCV RBC AUTO: 94.3 FL (ref 80–94)
MONOCYTES # BLD AUTO: 0.72 X10(3)/MCL (ref 0.1–1.3)
MONOCYTES NFR BLD AUTO: 8.3 %
NEUTROPHILS # BLD AUTO: 5.76 X10(3)/MCL (ref 2.1–9.2)
NEUTROPHILS NFR BLD AUTO: 66.8 %
NRBC BLD AUTO-RTO: 0 %
PLATELET # BLD AUTO: 379 X10(3)/MCL (ref 130–400)
PMV BLD AUTO: 9.5 FL (ref 7.4–10.4)
RBC # BLD AUTO: 4.57 X10(6)/MCL (ref 4.2–5.4)
WBC # BLD AUTO: 8.63 X10(3)/MCL (ref 4.5–11.5)

## 2025-05-27 PROCEDURE — 85652 RBC SED RATE AUTOMATED: CPT

## 2025-05-27 PROCEDURE — 3074F SYST BP LT 130 MM HG: CPT | Mod: CPTII,,, | Performed by: ORTHOPAEDIC SURGERY

## 2025-05-27 PROCEDURE — 1101F PT FALLS ASSESS-DOCD LE1/YR: CPT | Mod: CPTII,,, | Performed by: ORTHOPAEDIC SURGERY

## 2025-05-27 PROCEDURE — 85025 COMPLETE CBC W/AUTO DIFF WBC: CPT

## 2025-05-27 PROCEDURE — 86141 C-REACTIVE PROTEIN HS: CPT

## 2025-05-27 PROCEDURE — 36415 COLL VENOUS BLD VENIPUNCTURE: CPT

## 2025-05-27 PROCEDURE — 73502 X-RAY EXAM HIP UNI 2-3 VIEWS: CPT | Mod: LT,,, | Performed by: ORTHOPAEDIC SURGERY

## 2025-05-27 PROCEDURE — 1159F MED LIST DOCD IN RCRD: CPT | Mod: CPTII,,, | Performed by: ORTHOPAEDIC SURGERY

## 2025-05-27 PROCEDURE — 99214 OFFICE O/P EST MOD 30 MIN: CPT | Mod: ,,, | Performed by: ORTHOPAEDIC SURGERY

## 2025-05-27 PROCEDURE — 3288F FALL RISK ASSESSMENT DOCD: CPT | Mod: CPTII,,, | Performed by: ORTHOPAEDIC SURGERY

## 2025-05-27 PROCEDURE — 4010F ACE/ARB THERAPY RXD/TAKEN: CPT | Mod: CPTII,,, | Performed by: ORTHOPAEDIC SURGERY

## 2025-05-27 PROCEDURE — 3078F DIAST BP <80 MM HG: CPT | Mod: CPTII,,, | Performed by: ORTHOPAEDIC SURGERY

## 2025-05-27 PROCEDURE — 3008F BODY MASS INDEX DOCD: CPT | Mod: CPTII,,, | Performed by: ORTHOPAEDIC SURGERY

## 2025-05-27 NOTE — PROGRESS NOTES
Chief Complaint:   Chief Complaint   Patient presents with    Hip Pain     Left hip pain. Patient went to ER  and had to have hip popped back in place after standing up from the recliner at home. She states she is having a little pain still she is taking OTC Tylenol. Patient is with  and is wearing her brace.       History of present illness:    History of Present Illness  The patient presents for evaluation of hip dislocation.    She reports a recurrence of hip dislocation 2 weeks after discontinuing the use of her brace, which she had been wearing for 3 months. The incident occurred in the morning when she attempted to put on her socks without bending, using grabbers instead. Despite her cautious approach, she experienced a shift in her hip while rising from a seated position, prompting her to call for emergency assistance. She expresses concern about the potential need for another surgical intervention and is apprehensive about the possibility of further dislocations even with the brace on.    Her last surgical procedure was performed on 06/10/2024. Additionally, she mentions that her handicap sticker is due to  at the end of the current week.    Past Medical History:   Diagnosis Date    Arthritis     Avulsion fracture of metatarsal bone of left foot     Bursitis of left hip     Heart valve regurgitation     High cholesterol     Hypertension     Sleep apnea, unspecified     Thyroid disease        Past Surgical History:   Procedure Laterality Date    ARTHROCENTESIS OF HIP JOINT Left 2024    Procedure: ARTHROCENTESIS, HIP;  Surgeon: Ashkan Taylor MD;  Location: [facility];  Service: Orthopedics;  Laterality: Left;  left hip aspiration    COLONOSCOPY  2024    CYSTOSCOPY      DILATION AND CURETTAGE OF UTERUS      JOINT REPLACEMENT  Dec 2017    Total left hip replacement    LAPAROSCOPY      RADIONUCLIDE BONE SCAN      REVISION TOTAL HIP ARTHROPLASTY Left 06/10/2024    Procedure: REVISION,  TOTAL ARTHROPLASTY, HIP - ACETABULUM / PATHOLOGY/ SARAH STEM/ CELL SAVER;  Surgeon: Ashkan Taylor MD;  Location: [facility];  Service: Orthopedics;  Laterality: Left;  revision L KARON, SPINAL       Current Outpatient Medications   Medication Sig    atorvastatin (LIPITOR) 40 MG tablet Take 40 mg by mouth every evening.    calcium carbonate (OS-CLAY) 600 mg calcium (1,500 mg) Tab Take 600 mg by mouth once daily. With vit D3 (400IU)    cranberry 500 mg Cap Take 1 capsule by mouth once daily.    diclofenac (VOLTAREN) 75 MG EC tablet     levothyroxine (SYNTHROID) 88 MCG tablet Take 88 mcg by mouth nightly.    lisinopriL 10 MG tablet Take 10 mg by mouth nightly.    multivitamin (ONE DAILY MULTIVITAMIN) per tablet Take 1 tablet by mouth once daily.     No current facility-administered medications for this visit.       Review of patient's allergies indicates:   Allergen Reactions    Honey     Penicillins Other (See Comments)     childhood    Shellfish containing products     Sulfa (sulfonamide antibiotics)     Wheat containing prod     Yeast     Latex, natural rubber        Family History   Problem Relation Name Age of Onset    Arthritis Mother Radha Janeu     Depression Mother Radha Janeu     Heart disease Mother Radha Janeu     Hypertension Mother Radha Janeu     Kidney disease Mother Radha Janeu     Stroke Mother Radha Parsons     Early death Father Romario Valenzuelayou     Heart disease Father Romario Parsons     Hypertension Father Romario Parsons     Arthritis Brother Tavo Valenzuelayou     Cancer Brother Tavo Valenzuelayou     Kidney disease Brother Tavo Valenzuelayou     Cancer Brother Bernardo Valenzuelamk     Diabetes Brother Bernardo Valenzuelayou     Heart disease Sister Cori ARNOLDArnaldo Green     Hypertension Sister Cori ARNOLDArnaldo Green     Miscarriages / Stillbirths Sister Cori ARNOLDArnaldo Green        Social History[1]        Review of Systems:    Constitution: Negative for chills, fever, and sweats.  Negative for  unexplained weight loss.    HENT:  Negative for headaches and blurry vision.    Cardiovascular:Negative for chest pain or irregular heart beat. Negative for hypertension.    Respiratory:  Negative for cough and shortness of breath.    Gastrointestinal: Negative for abdominal pain, heartburn, melena, nausea, and vomitting.    Genitourinary:  Negative bladder incontinence and dysuria.    Musculoskeletal:  See HPI    Neurological: Negative for numbness.    Psychiatric/Behavioral: Negative for depression.  The patient is not nervous/anxious.      Endocrine: Negative for polyuria    Hematologic/Lymphatic: Negative for bleeding problem.  Does not bruise/bleed easily.    Skin: Negative for poor would healing and rash      Physical Examination:    Vital Signs:    Vitals:    05/27/25 1221   BP: 108/69   Pulse: 76   Temp: 98 °F (36.7 °C)       Body mass index is 35.29 kg/m².    General: No acute distress, alert and oriented, healthy appearing    HEENT: Head is atraumatic, mucous membranes are moist    Neck: Supples, no JVD    Cardiovascular: Palpable dorsalis pedis and posterior tibial pulses, regular rate and rhythm to those pulses    Lungs: Breathing non-labored    Skin: no rashes appreciated    Neurologic: Can flex and extend knees, ankles, and toes. Sensation is grossly intact    Left hip:  Range of motion of the left hip without significant or severe pain.  Brisk capillary refill distally.  Sensation intact distally.    X-rays:  Three views of the left hip reviewed today.  Patient is status post closed reduction of the left hip.  Concentric.  Implants in appropriate alignment.  Appeared to be healed.  It would not appear loose     Assessment::  Left total hip with a dislocation    Plan:  Patient has proven that has hip is going to be unstable.  We will work her up for infection with blood work and possibly an aspiration.  Go and get a CT scan of the left hip as well to evaluate component positioning that has well as  ingrowth.    This note was generated with the assistance of ambient listening technology. Verbal consent was obtained by the patient and accompanying visitor(s) for the recording of patient appointment to facilitate this note. I attest to having reviewed and edited the generated note for accuracy, though some syntax or spelling errors may persist. Please contact the author of this note for any clarification.      This note was created using [employer] voice recognition software that occasionally misinterpreted phrases or words.    Consult note is delivered via Epic messaging service.         [1]   Social History  Socioeconomic History    Marital status:    Tobacco Use    Smoking status: Never    Smokeless tobacco: Never   Substance and Sexual Activity    Alcohol use: Yes     Alcohol/week: 1.0 standard drink of alcohol     Types: 1 Glasses of wine per week     Comment: Seldom    Drug use: Never    Sexual activity: Yes     Partners: Male     Birth control/protection: None

## 2025-06-05 ENCOUNTER — HOSPITAL ENCOUNTER (OUTPATIENT)
Dept: RADIOLOGY | Facility: HOSPITAL | Age: 70
Discharge: HOME | End: 2025-06-05
Attending: ORTHOPAEDIC SURGERY
Payer: MEDICARE

## 2025-06-05 DIAGNOSIS — Z96.642 PRESENCE OF LEFT ARTIFICIAL HIP JOINT: ICD-10-CM

## 2025-06-05 PROCEDURE — 73700 CT LOWER EXTREMITY W/O DYE: CPT | Mod: TC,LT

## 2025-06-13 ENCOUNTER — PATIENT MESSAGE (OUTPATIENT)
Dept: ORTHOPEDICS | Facility: CLINIC | Age: 70
End: 2025-06-13
Payer: MEDICARE

## 2025-07-10 ENCOUNTER — OFFICE VISIT (OUTPATIENT)
Dept: ORTHOPEDICS | Facility: CLINIC | Age: 70
End: 2025-07-10
Payer: MEDICARE

## 2025-07-10 VITALS
HEART RATE: 70 BPM | DIASTOLIC BLOOD PRESSURE: 75 MMHG | BODY MASS INDEX: 35.55 KG/M2 | HEIGHT: 65 IN | SYSTOLIC BLOOD PRESSURE: 120 MMHG | WEIGHT: 213.38 LBS

## 2025-07-10 DIAGNOSIS — Z96.649 FAILED TOTAL HIP ARTHROPLASTY, INITIAL ENCOUNTER: Primary | ICD-10-CM

## 2025-07-10 DIAGNOSIS — T84.018A FAILED TOTAL HIP ARTHROPLASTY, INITIAL ENCOUNTER: Primary | ICD-10-CM

## 2025-07-11 DIAGNOSIS — Z96.649 FAILED TOTAL HIP ARTHROPLASTY, INITIAL ENCOUNTER: Primary | ICD-10-CM

## 2025-07-11 DIAGNOSIS — T84.018A FAILED TOTAL HIP ARTHROPLASTY, INITIAL ENCOUNTER: Primary | ICD-10-CM

## 2025-07-11 NOTE — PROGRESS NOTES
Chief Complaint:   Chief Complaint   Patient presents with    Results     Results CT and Labs  Dislocation Lt Hip Revision Sx 6/10/24 patient states it hurts some like a achy pain.       History of present illness:  History of Present Illness    CHIEF COMPLAINT:  - Hip instability and dislocation concerns following left total hip arthroplasty revision.    HPI:  Radha presents for follow-up regarding hip instability following a previous hip revision surgery. She reports no hip dislocations while wearing a brace for three months. However, her hip immediately dislocated while attempting to stand from a chair after removing the brace. She states that the dislocation occurred when she attempted to rise from her chair. She has not attempted to climb stairs at home due to concerns about stability.    Hip instability limits her daily activities. She expresses discomfort in performing any activities and wishes to participate in yard work and bend over to feed her pets. The onset of her current instability occurred approximately seven months after her previous surgery, which is noted as an unusual timeframe for such complications.    She mentions scheduled cataract surgeries for August 18th and September 8th, which she is willing to postpone if necessary for hip treatment.    She denies any medical diagnoses.    PREVIOUS TREATMENTS:  - She had hip surgery in 2017 performed by Dr. Lala, which failed due to the socket not bonding properly  - She had a revision hip surgery approximately 1 year ago, which included bone grafting and socket replacement  - She has been wearing a hip brace for 3 months, which has prevented dislocation while wearing it    IMAGING:  - CT Left Hip: Socket is in an acceptable position at approximately 45 degree angle in one view, Socket is neutral (facing sideways) in another view, which is not ideal, Some bone graft resorption, Possible spot welds of healing bone in the back, Screws are still in  place, Two acetabular screws extend beyond the cortical margin, A piece of a broken screw from the previous surgery remains in the bone    SURGICAL HISTORY:  - Cataract surgery: scheduled for August 18th and September 8th  - Hip surgery: 2017, performed by Dr. Lala  - Hip revision surgery: approximately 1 year ago      ROS:  Musculoskeletal: +difficulty standing up, +pain with movement          Past Medical History:   Diagnosis Date    Arthritis     Avulsion fracture of metatarsal bone of left foot     Bursitis of left hip     Heart valve regurgitation     High cholesterol     Hypertension     Sleep apnea, unspecified     Thyroid disease        Past Surgical History:   Procedure Laterality Date    ARTHROCENTESIS OF HIP JOINT Left 04/26/2024    Procedure: ARTHROCENTESIS, HIP;  Surgeon: Ashkan Taylor MD;  Location: [facility] OR;  Service: Orthopedics;  Laterality: Left;  left hip aspiration    COLONOSCOPY  12/2024    CYSTOSCOPY      DILATION AND CURETTAGE OF UTERUS      JOINT REPLACEMENT  Dec 2017    Total left hip replacement    LAPAROSCOPY      RADIONUCLIDE BONE SCAN      REVISION TOTAL HIP ARTHROPLASTY Left 06/10/2024    Procedure: REVISION, TOTAL ARTHROPLASTY, HIP - ACETABULUM / PATHOLOGY/ SARAH STEM/ CELL SAVER;  Surgeon: Ashkan Taylor MD;  Location: [facility] OR;  Service: Orthopedics;  Laterality: Left;  revision L KARON, SPINAL       Current Outpatient Medications   Medication Sig    atorvastatin (LIPITOR) 40 MG tablet Take 40 mg by mouth every evening.    calcium carbonate (OS-CLAY) 600 mg calcium (1,500 mg) Tab Take 600 mg by mouth once daily. With vit D3 (400IU)    cranberry 500 mg Cap Take 1 capsule by mouth once daily.    diclofenac (VOLTAREN) 75 MG EC tablet     levothyroxine (SYNTHROID) 88 MCG tablet Take 88 mcg by mouth nightly.    lisinopriL 10 MG tablet Take 10 mg by mouth nightly.    multivitamin (ONE DAILY MULTIVITAMIN) per tablet Take 1 tablet by mouth once daily.     No current  facility-administered medications for this visit.       Review of patient's allergies indicates:   Allergen Reactions    Honey     Penicillins Other (See Comments)     childhood    Shellfish containing products     Sulfa (sulfonamide antibiotics)     Wheat containing prod     Yeast     Latex, natural rubber        Family History   Problem Relation Name Age of Onset    Arthritis Mother Radha Parsons     Depression Mother Radha Parsons     Heart disease Mother Radha Parsons     Hypertension Mother Radha Parsons     Kidney disease Mother Radha Parsons     Stroke Mother Radha Parsons     Early death Father Romario Parsons     Heart disease Father Romario Parsons     Hypertension Father Romario Parsons     Arthritis Brother Tavo Parsons     Cancer Brother Tavo Parsons     Kidney disease Brother Tavo Parsons     Cancer Brother Bernardo Parsons     Diabetes Brother Bernardo Parsons     Heart disease Sister Cori Green     Hypertension Sister Cori Green     Miscarriages / Stillbirths Sister Cori Green        Social History[1]        Review of Systems:    Constitution: Negative for chills, fever, and sweats.  Negative for unexplained weight loss.    HENT:  Negative for headaches and blurry vision.    Cardiovascular:Negative for chest pain or irregular heart beat. Negative for hypertension.    Respiratory:  Negative for cough and shortness of breath.    Gastrointestinal: Negative for abdominal pain, heartburn, melena, nausea, and vomitting.    Genitourinary:  Negative bladder incontinence and dysuria.    Musculoskeletal:  See HPI    Neurological: Negative for numbness.    Psychiatric/Behavioral: Negative for depression.  The patient is not nervous/anxious.      Endocrine: Negative for polyuria    Hematologic/Lymphatic: Negative for bleeding problem.  Does not bruise/bleed easily.    Skin: Negative for poor would healing and rash      Physical Examination:    Vital Signs:     Vitals:    07/10/25 1552   BP: 120/75   Pulse: 70       Body mass index is 35.51 kg/m².    General: No acute distress, alert and oriented, healthy appearing    HEENT: Head is atraumatic, mucous membranes are moist    Neck: Supples, no JVD    Cardiovascular: Palpable dorsalis pedis and posterior tibial pulses, regular rate and rhythm to those pulses    Lungs: Breathing non-labored    Skin: no rashes appreciated    Neurologic: Can flex and extend knees, ankles, and toes. Sensation is grossly intact    Left hip:  Patient has a brace in place.  Brisk cap refill disappeared sensation intact disappeared range of motion of the left hip without significant discomfort today.    X-rays:  CT scan reviewed.  Patient with neutral acetabular component.  Inclination that has good version in his neutral.  Appears fixed.     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  She continues to have instability.  We had discussed all treatment options today.  It would like to proceed with surgical intervention.  Plan for revision of the left hip acetabulum only.  We could try a head and liner to see if we are going to worked with the acetabular positioning however we would need to be ready to remove her cup get it in better position.  All questions answered to patient's satisfaction.  No guarantees made.    This note was generated with the assistance of ambient listening technology. Verbal consent was obtained by the patient and accompanying visitor(s) for the recording of patient appointment to facilitate this note. I attest to having reviewed and edited the generated note for accuracy, though some syntax or spelling errors may persist. Please contact the author of this note for any clarification.       This note was created using [employer] voice recognition software that occasionally misinterpreted phrases or words.    Consult note is delivered via Epic messaging  service.         [1]   Social History  Socioeconomic History    Marital status:    Tobacco Use    Smoking status: Never    Smokeless tobacco: Never   Substance and Sexual Activity    Alcohol use: Yes     Alcohol/week: 1.0 standard drink of alcohol     Types: 1 Glasses of wine per week     Comment: Seldom    Drug use: Never    Sexual activity: Yes     Partners: Male     Birth control/protection: None

## 2025-09-04 ENCOUNTER — OFFICE VISIT (OUTPATIENT)
Dept: ORTHOPEDICS | Facility: CLINIC | Age: 70
End: 2025-09-04
Payer: MEDICARE

## 2025-09-04 ENCOUNTER — HOSPITAL ENCOUNTER (OUTPATIENT)
Dept: RADIOLOGY | Facility: HOSPITAL | Age: 70
Discharge: HOME | End: 2025-09-04
Attending: PHYSICIAN ASSISTANT
Payer: MEDICARE

## 2025-09-04 DIAGNOSIS — R79.9 ABNORMAL BLOOD CHEMISTRY: ICD-10-CM

## 2025-09-04 DIAGNOSIS — T84.021D FAILURE OF LEFT TOTAL HIP ARTHROPLASTY WITH DISLOCATION OF HIP, SUBSEQUENT ENCOUNTER: ICD-10-CM

## 2025-09-04 DIAGNOSIS — M19.90 OSTEOARTHRITIS: ICD-10-CM

## 2025-09-04 DIAGNOSIS — T84.021D FAILURE OF LEFT TOTAL HIP ARTHROPLASTY WITH DISLOCATION OF HIP, SUBSEQUENT ENCOUNTER: Primary | ICD-10-CM

## 2025-09-04 PROCEDURE — 99213 OFFICE O/P EST LOW 20 MIN: CPT | Mod: ,,, | Performed by: PHYSICIAN ASSISTANT

## 2025-09-04 PROCEDURE — 3288F FALL RISK ASSESSMENT DOCD: CPT | Mod: CPTII,,, | Performed by: PHYSICIAN ASSISTANT

## 2025-09-04 PROCEDURE — 1159F MED LIST DOCD IN RCRD: CPT | Mod: CPTII,,, | Performed by: PHYSICIAN ASSISTANT

## 2025-09-04 PROCEDURE — 3044F HG A1C LEVEL LT 7.0%: CPT | Mod: CPTII,,, | Performed by: PHYSICIAN ASSISTANT

## 2025-09-04 PROCEDURE — 1160F RVW MEDS BY RX/DR IN RCRD: CPT | Mod: CPTII,,, | Performed by: PHYSICIAN ASSISTANT

## 2025-09-04 PROCEDURE — 1101F PT FALLS ASSESS-DOCD LE1/YR: CPT | Mod: CPTII,,, | Performed by: PHYSICIAN ASSISTANT

## 2025-09-04 PROCEDURE — 4010F ACE/ARB THERAPY RXD/TAKEN: CPT | Mod: CPTII,,, | Performed by: PHYSICIAN ASSISTANT

## 2025-09-04 PROCEDURE — 71046 X-RAY EXAM CHEST 2 VIEWS: CPT | Mod: TC

## 2025-09-04 RX ORDER — SODIUM CHLORIDE, SODIUM GLUCONATE, SODIUM ACETATE, POTASSIUM CHLORIDE AND MAGNESIUM CHLORIDE 30; 37; 368; 526; 502 MG/100ML; MG/100ML; MG/100ML; MG/100ML; MG/100ML
INJECTION, SOLUTION INTRAVENOUS CONTINUOUS
OUTPATIENT
Start: 2025-09-04

## 2025-09-04 RX ORDER — SCOPOLAMINE 1 MG/3D
1 PATCH, EXTENDED RELEASE TRANSDERMAL ONCE AS NEEDED
OUTPATIENT
Start: 2025-09-04 | End: 2037-01-31

## 2025-09-04 RX ORDER — ACETAMINOPHEN 500 MG
1000 TABLET ORAL
OUTPATIENT
Start: 2025-09-04

## 2025-09-04 RX ORDER — KETOROLAC TROMETHAMINE 10 MG/1
10 TABLET, FILM COATED ORAL
OUTPATIENT
Start: 2025-09-04 | End: 2025-09-04

## 2025-09-04 RX ORDER — MOXIFLOXACIN 5 MG/ML
SOLUTION/ DROPS OPHTHALMIC
COMMUNITY
Start: 2025-08-26

## 2025-09-04 RX ORDER — PREDNISOLONE ACETATE 10 MG/ML
SUSPENSION/ DROPS OPHTHALMIC
COMMUNITY
Start: 2025-08-26

## 2025-09-04 RX ORDER — ONDANSETRON 4 MG/1
4 TABLET, ORALLY DISINTEGRATING ORAL
OUTPATIENT
Start: 2025-09-04

## 2025-09-04 RX ORDER — GABAPENTIN 100 MG/1
300 CAPSULE ORAL
OUTPATIENT
Start: 2025-09-04

## 2025-09-04 RX ORDER — TRANEXAMIC ACID 650 MG/1
1950 TABLET ORAL
OUTPATIENT
Start: 2025-09-04 | End: 2025-09-04

## 2025-09-04 RX ORDER — KETOROLAC TROMETHAMINE 5 MG/ML
SOLUTION OPHTHALMIC
COMMUNITY
Start: 2025-08-26

## 2025-09-04 RX ORDER — TAMSULOSIN HYDROCHLORIDE 0.4 MG/1
0.4 CAPSULE ORAL
OUTPATIENT
Start: 2025-09-04

## (undated) DEVICE — APPLICATOR CHLORAPREP ORN 26ML

## (undated) DEVICE — KIT TOTAL HIP HLGC

## (undated) DEVICE — BANDAGE ADHESIVE FABRIC 2X4

## (undated) DEVICE — COVER HD BACK TABLE 6FT

## (undated) DEVICE — DRAPE MEDIUM SHEET 40X70IN

## (undated) DEVICE — GOWN POLY REINF X-LONG 2XL

## (undated) DEVICE — KIT DRAIN WOUND RND SPRNG RESV

## (undated) DEVICE — SUT BLU BR 2 TAPERD NDL 1/2

## (undated) DEVICE — RETRIEVER SUTURE HEWSON DISP

## (undated) DEVICE — TOWEL OR DISP STRL BLUE 4/PK

## (undated) DEVICE — GLOVE SENSICARE PI GRN 7

## (undated) DEVICE — SYR 30CC LUER LOCK

## (undated) DEVICE — Device

## (undated) DEVICE — GLOVE SIGNATURE ESSNTL LTX 8.5

## (undated) DEVICE — STRAP POS KNEE BODY 4X60IN

## (undated) DEVICE — GLOVE SENSICARE PI ORTHO LT 8

## (undated) DEVICE — SPONGE COTTON TRAY 4X4IN

## (undated) DEVICE — DRAPE SURG W/TWL 17 5/8X23

## (undated) DEVICE — STAPLER SKIN PROXIMATE WIDE

## (undated) DEVICE — CONTAINER SPECIMEN SCREW 4OZ

## (undated) DEVICE — SYR 10CC LUER LOCK

## (undated) DEVICE — NDL HYPO REG 25G X 1 1/2

## (undated) DEVICE — SOL NACL IRR 1000ML BTL

## (undated) DEVICE — KIT SURGICAL TURNOVER

## (undated) DEVICE — CULTSWAB+ AMIES W/O CHARC DBL

## (undated) DEVICE — DEVICE STRATAFIX SYMMETRIC +

## (undated) DEVICE — SUT MONO 2-0 CT-1 VIL

## (undated) DEVICE — NDL ANES SPINAL 18X3.5ST 18G

## (undated) DEVICE — DRAPE STERI U-SHAPED 47X51IN

## (undated) DEVICE — SOL POVIDONE IODINE PCH 3/4OZ

## (undated) DEVICE — SOL NACL IRR 3000ML

## (undated) DEVICE — CONTRAST ISOVUE 300 50ML

## (undated) DEVICE — KIT C.A.T.S. FAST START

## (undated) DEVICE — PILLOW ABDUCTION FOAM MED

## (undated) DEVICE — TAPE ADH MEDIPORE 4 X 10YDS

## (undated) DEVICE — DRAPE FULL SHEET 70X100IN

## (undated) DEVICE — DRAPE INCISE IOBAN 2 23X23IN

## (undated) DEVICE — GLOVE SENSICARE PI MICRO 6.5

## (undated) DEVICE — DRESSING XEROFORM NONADH 1X8IN

## (undated) DEVICE — SUT VICRYL BR 1 GEN 27 CT-1

## (undated) DEVICE — ELECTRODE PATIENT RETURN DISP

## (undated) DEVICE — PAD ABDOMINAL STERILE 8X10IN

## (undated) DEVICE — GLOVE SENSICARE PI GRN 8.5